# Patient Record
Sex: FEMALE | Race: ASIAN | NOT HISPANIC OR LATINO | Employment: UNEMPLOYED | URBAN - METROPOLITAN AREA
[De-identification: names, ages, dates, MRNs, and addresses within clinical notes are randomized per-mention and may not be internally consistent; named-entity substitution may affect disease eponyms.]

---

## 2017-01-31 ENCOUNTER — RECORDS - HEALTHEAST (OUTPATIENT)
Dept: GENERAL RADIOLOGY | Facility: CLINIC | Age: 32
End: 2017-01-31

## 2017-01-31 ENCOUNTER — OFFICE VISIT - HEALTHEAST (OUTPATIENT)
Dept: FAMILY MEDICINE | Facility: CLINIC | Age: 32
End: 2017-01-31

## 2017-01-31 DIAGNOSIS — M54.50 LOW BACK PAIN: ICD-10-CM

## 2017-01-31 DIAGNOSIS — M54.16 RADICULOPATHY, LUMBAR REGION: ICD-10-CM

## 2017-01-31 DIAGNOSIS — M54.16 LUMBAR RADICULAR PAIN: ICD-10-CM

## 2017-02-14 ENCOUNTER — OFFICE VISIT - HEALTHEAST (OUTPATIENT)
Dept: FAMILY MEDICINE | Facility: CLINIC | Age: 32
End: 2017-02-14

## 2017-02-14 DIAGNOSIS — M54.16 LUMBAR RADICULAR PAIN: ICD-10-CM

## 2017-02-14 ASSESSMENT — MIFFLIN-ST. JEOR: SCORE: 1086.75

## 2017-02-17 ENCOUNTER — RECORDS - HEALTHEAST (OUTPATIENT)
Dept: ADMINISTRATIVE | Facility: OTHER | Age: 32
End: 2017-02-17

## 2017-02-22 ENCOUNTER — OFFICE VISIT - HEALTHEAST (OUTPATIENT)
Dept: FAMILY MEDICINE | Facility: CLINIC | Age: 32
End: 2017-02-22

## 2017-02-22 ENCOUNTER — AMBULATORY - HEALTHEAST (OUTPATIENT)
Dept: NEUROSURGERY | Facility: CLINIC | Age: 32
End: 2017-02-22

## 2017-02-22 DIAGNOSIS — M54.17 LUMBOSACRAL RADICULOPATHY AT S1: ICD-10-CM

## 2017-02-22 DIAGNOSIS — Z23 NEED FOR IMMUNIZATION AGAINST INFLUENZA: ICD-10-CM

## 2017-02-28 ENCOUNTER — AMBULATORY - HEALTHEAST (OUTPATIENT)
Dept: NEUROSURGERY | Facility: CLINIC | Age: 32
End: 2017-02-28

## 2017-02-28 DIAGNOSIS — M54.9 BACK PAIN: ICD-10-CM

## 2017-03-07 ENCOUNTER — AMBULATORY - HEALTHEAST (OUTPATIENT)
Dept: NEUROSURGERY | Facility: CLINIC | Age: 32
End: 2017-03-07

## 2017-03-07 ENCOUNTER — OFFICE VISIT - HEALTHEAST (OUTPATIENT)
Dept: NEUROSURGERY | Facility: CLINIC | Age: 32
End: 2017-03-07

## 2017-03-07 ENCOUNTER — HOSPITAL ENCOUNTER (OUTPATIENT)
Dept: RADIOLOGY | Facility: CLINIC | Age: 32
Discharge: HOME | End: 2017-03-07
Attending: NEUROLOGICAL SURGERY

## 2017-03-07 DIAGNOSIS — M51.16 LUMBAR DISC HERNIATION WITH RADICULOPATHY: ICD-10-CM

## 2017-03-07 DIAGNOSIS — M54.9 BACK PAIN: ICD-10-CM

## 2017-03-07 ASSESSMENT — MIFFLIN-ST. JEOR: SCORE: 1055

## 2017-03-15 ENCOUNTER — HOSPITAL ENCOUNTER (OUTPATIENT)
Dept: PHYSICAL MEDICINE AND REHAB | Facility: CLINIC | Age: 32
Discharge: HOME | End: 2017-03-15
Attending: NEUROLOGICAL SURGERY

## 2017-03-15 DIAGNOSIS — M51.16 LUMBAR DISC HERNIATION WITH RADICULOPATHY: ICD-10-CM

## 2017-03-15 DIAGNOSIS — M54.16 LUMBAR RADICULITIS: ICD-10-CM

## 2017-03-15 DIAGNOSIS — M53.3 SACROILIAC JOINT DYSFUNCTION OF RIGHT SIDE: ICD-10-CM

## 2017-03-15 DIAGNOSIS — G47.9 SLEEP DISTURBANCE: ICD-10-CM

## 2017-03-15 DIAGNOSIS — M51.26 LUMBAR DISC HERNIATION: ICD-10-CM

## 2017-03-15 ASSESSMENT — MIFFLIN-ST. JEOR: SCORE: 1073.14

## 2017-03-29 ENCOUNTER — HOSPITAL ENCOUNTER (OUTPATIENT)
Dept: PHYSICAL MEDICINE AND REHAB | Facility: CLINIC | Age: 32
Discharge: HOME | End: 2017-03-29
Attending: PHYSICIAN ASSISTANT

## 2017-03-29 DIAGNOSIS — M51.16 LUMBAR DISC HERNIATION WITH RADICULOPATHY: ICD-10-CM

## 2017-04-05 ENCOUNTER — PRENATAL OFFICE VISIT - HEALTHEAST (OUTPATIENT)
Dept: FAMILY MEDICINE | Facility: CLINIC | Age: 32
End: 2017-04-05

## 2017-04-05 DIAGNOSIS — Z34.90 NORMAL PREGNANCY: ICD-10-CM

## 2017-04-05 DIAGNOSIS — N92.6 ABNORMAL MENSES: ICD-10-CM

## 2017-04-05 DIAGNOSIS — Z32.01 POSITIVE PREGNANCY TEST: ICD-10-CM

## 2017-04-27 ENCOUNTER — HOSPITAL ENCOUNTER (OUTPATIENT)
Dept: PHYSICAL MEDICINE AND REHAB | Facility: CLINIC | Age: 32
Discharge: HOME | End: 2017-04-27
Attending: PHYSICIAN ASSISTANT

## 2017-04-27 DIAGNOSIS — M51.16 LUMBAR DISC HERNIATION WITH RADICULOPATHY: ICD-10-CM

## 2017-05-09 ENCOUNTER — AMBULATORY - HEALTHEAST (OUTPATIENT)
Dept: PHYSICAL MEDICINE AND REHAB | Facility: CLINIC | Age: 32
End: 2017-05-09

## 2017-05-25 ENCOUNTER — HOSPITAL ENCOUNTER (OUTPATIENT)
Dept: PHYSICAL MEDICINE AND REHAB | Facility: CLINIC | Age: 32
Discharge: HOME | End: 2017-05-25
Attending: PHYSICIAN ASSISTANT

## 2017-05-25 DIAGNOSIS — M54.16 LUMBAR RADICULITIS: ICD-10-CM

## 2017-05-25 DIAGNOSIS — M51.26 LUMBAR DISC HERNIATION: ICD-10-CM

## 2017-05-25 DIAGNOSIS — M51.16 LUMBAR DISC HERNIATION WITH RADICULOPATHY: ICD-10-CM

## 2017-05-25 DIAGNOSIS — M79.18 MYOFASCIAL PAIN: ICD-10-CM

## 2017-07-13 ENCOUNTER — HOSPITAL ENCOUNTER (OUTPATIENT)
Dept: PHYSICAL MEDICINE AND REHAB | Facility: CLINIC | Age: 32
Discharge: HOME | End: 2017-07-13
Attending: PHYSICIAN ASSISTANT

## 2017-07-13 DIAGNOSIS — M54.16 LUMBAR RADICULITIS: ICD-10-CM

## 2017-07-13 DIAGNOSIS — M51.16 LUMBAR DISC HERNIATION WITH RADICULOPATHY: ICD-10-CM

## 2017-08-24 ENCOUNTER — HOSPITAL ENCOUNTER (OUTPATIENT)
Dept: PHYSICAL MEDICINE AND REHAB | Facility: CLINIC | Age: 32
Discharge: HOME | End: 2017-08-24
Attending: PHYSICIAN ASSISTANT

## 2017-08-24 DIAGNOSIS — M51.16 LUMBAR DISC HERNIATION WITH RADICULOPATHY: ICD-10-CM

## 2017-08-24 DIAGNOSIS — M54.16 LUMBAR RADICULITIS: ICD-10-CM

## 2017-08-30 ENCOUNTER — HOSPITAL ENCOUNTER (OUTPATIENT)
Dept: PHYSICAL MEDICINE AND REHAB | Facility: CLINIC | Age: 32
Discharge: HOME | End: 2017-08-30
Attending: PHYSICIAN ASSISTANT

## 2017-08-30 DIAGNOSIS — M51.16 LUMBAR DISC HERNIATION WITH RADICULOPATHY: ICD-10-CM

## 2017-08-30 DIAGNOSIS — M54.16 LUMBAR RADICULITIS: ICD-10-CM

## 2017-09-08 ENCOUNTER — HOSPITAL ENCOUNTER (OUTPATIENT)
Dept: PHYSICAL MEDICINE AND REHAB | Facility: CLINIC | Age: 32
Discharge: HOME | End: 2017-09-08
Attending: PHYSICIAN ASSISTANT

## 2017-09-08 DIAGNOSIS — M54.16 LUMBAR RADICULITIS: ICD-10-CM

## 2017-09-08 DIAGNOSIS — M51.16 LUMBAR DISC HERNIATION WITH RADICULOPATHY: ICD-10-CM

## 2017-09-08 RX ORDER — GABAPENTIN 100 MG/1
100 CAPSULE ORAL AT BEDTIME
Refills: 2 | Status: SHIPPED | COMMUNITY
Start: 2017-08-25 | End: 2021-10-04

## 2018-03-20 ENCOUNTER — OFFICE VISIT - HEALTHEAST (OUTPATIENT)
Dept: FAMILY MEDICINE | Facility: CLINIC | Age: 33
End: 2018-03-20

## 2018-03-20 DIAGNOSIS — M54.41 CHRONIC RIGHT-SIDED LOW BACK PAIN WITH RIGHT-SIDED SCIATICA: ICD-10-CM

## 2018-03-20 DIAGNOSIS — G89.29 CHRONIC RIGHT-SIDED LOW BACK PAIN WITH RIGHT-SIDED SCIATICA: ICD-10-CM

## 2018-03-20 RX ORDER — ASPIRIN 81 MG
TABLET,CHEWABLE ORAL
Qty: 1 TUBE | Refills: 0 | Status: SHIPPED | OUTPATIENT
Start: 2018-03-20 | End: 2021-10-15

## 2018-03-26 ENCOUNTER — HOSPITAL ENCOUNTER (OUTPATIENT)
Dept: PHYSICAL MEDICINE AND REHAB | Facility: CLINIC | Age: 33
Discharge: HOME | End: 2018-03-26
Attending: PHYSICIAN ASSISTANT

## 2018-03-26 DIAGNOSIS — M54.16 LUMBAR RADICULITIS: ICD-10-CM

## 2018-03-26 DIAGNOSIS — M51.16 LUMBAR DISC HERNIATION WITH RADICULOPATHY: ICD-10-CM

## 2018-04-18 ENCOUNTER — HOSPITAL ENCOUNTER (OUTPATIENT)
Dept: PHYSICAL MEDICINE AND REHAB | Facility: CLINIC | Age: 33
Discharge: HOME | End: 2018-04-18
Attending: PHYSICIAN ASSISTANT

## 2018-04-18 DIAGNOSIS — M51.16 LUMBAR DISC HERNIATION WITH RADICULOPATHY: ICD-10-CM

## 2018-04-18 DIAGNOSIS — M54.16 LUMBAR RADICULITIS: ICD-10-CM

## 2018-04-18 DIAGNOSIS — M51.26 LUMBAR DISC HERNIATION: ICD-10-CM

## 2018-04-24 ENCOUNTER — RECORDS - HEALTHEAST (OUTPATIENT)
Dept: LAB | Facility: CLINIC | Age: 33
End: 2018-04-24

## 2018-04-24 LAB
ALBUMIN SERPL-MCNC: 3.7 G/DL (ref 3.5–5)
ALP SERPL-CCNC: 93 U/L (ref 45–120)
ALT SERPL W P-5'-P-CCNC: 19 U/L (ref 0–45)
ANION GAP SERPL CALCULATED.3IONS-SCNC: 17 MMOL/L (ref 5–18)
AST SERPL W P-5'-P-CCNC: 13 U/L (ref 0–40)
BILIRUB SERPL-MCNC: 0.6 MG/DL (ref 0–1)
BUN SERPL-MCNC: 11 MG/DL (ref 8–22)
CALCIUM SERPL-MCNC: 9.2 MG/DL (ref 8.5–10.5)
CHLORIDE BLD-SCNC: 103 MMOL/L (ref 98–107)
CHOLEST SERPL-MCNC: 295 MG/DL
CO2 SERPL-SCNC: 20 MMOL/L (ref 22–31)
CREAT SERPL-MCNC: 0.63 MG/DL (ref 0.6–1.1)
FASTING STATUS PATIENT QL REPORTED: NO
GFR SERPL CREATININE-BSD FRML MDRD: >60 ML/MIN/1.73M2
GLUCOSE BLD-MCNC: 138 MG/DL (ref 70–125)
HDLC SERPL-MCNC: 74 MG/DL
LDLC SERPL CALC-MCNC: 194 MG/DL
POTASSIUM BLD-SCNC: 3.6 MMOL/L (ref 3.5–5)
PROT SERPL-MCNC: 7.6 G/DL (ref 6–8)
SODIUM SERPL-SCNC: 140 MMOL/L (ref 136–145)
TRIGL SERPL-MCNC: 133 MG/DL

## 2018-04-25 LAB
C TRACH DNA SPEC QL PROBE+SIG AMP: NEGATIVE
HPV SOURCE: NORMAL
HUMAN PAPILLOMA VIRUS 16 DNA: NEGATIVE
HUMAN PAPILLOMA VIRUS 18 DNA: NEGATIVE
HUMAN PAPILLOMA VIRUS FINAL DIAGNOSIS: NORMAL
HUMAN PAPILLOMA VIRUS OTHER HR: NEGATIVE
N GONORRHOEA DNA SPEC QL NAA+PROBE: NEGATIVE
SPECIMEN DESCRIPTION: NORMAL

## 2018-05-14 ENCOUNTER — COMMUNICATION - HEALTHEAST (OUTPATIENT)
Dept: PHYSICAL MEDICINE AND REHAB | Facility: CLINIC | Age: 33
End: 2018-05-14

## 2018-05-14 ENCOUNTER — HOSPITAL ENCOUNTER (OUTPATIENT)
Dept: PHYSICAL MEDICINE AND REHAB | Facility: CLINIC | Age: 33
Discharge: HOME | End: 2018-05-14
Attending: PHYSICIAN ASSISTANT

## 2018-05-14 DIAGNOSIS — M54.16 LUMBAR RADICULITIS: ICD-10-CM

## 2018-05-14 DIAGNOSIS — G47.9 SLEEP DISTURBANCE: ICD-10-CM

## 2018-05-14 DIAGNOSIS — M51.16 LUMBAR DISC HERNIATION WITH RADICULOPATHY: ICD-10-CM

## 2018-05-15 RX ORDER — METHOCARBAMOL 500 MG/1
TABLET, FILM COATED ORAL
Qty: 30 TABLET | Refills: 3 | Status: SHIPPED | OUTPATIENT
Start: 2018-05-15 | End: 2021-10-04

## 2018-05-25 ENCOUNTER — RECORDS - HEALTHEAST (OUTPATIENT)
Dept: ADMINISTRATIVE | Facility: OTHER | Age: 33
End: 2018-05-25

## 2021-05-30 VITALS — WEIGHT: 108 LBS | BODY MASS INDEX: 22.38 KG/M2

## 2021-05-30 VITALS — WEIGHT: 105 LBS | HEIGHT: 58 IN | BODY MASS INDEX: 22.04 KG/M2

## 2021-05-30 VITALS — HEIGHT: 58 IN | WEIGHT: 109 LBS | BODY MASS INDEX: 22.88 KG/M2

## 2021-05-30 VITALS — BODY MASS INDEX: 22.38 KG/M2 | WEIGHT: 108 LBS

## 2021-05-30 VITALS — BODY MASS INDEX: 22.59 KG/M2 | WEIGHT: 109 LBS

## 2021-05-30 VITALS — BODY MASS INDEX: 22.64 KG/M2 | WEIGHT: 108 LBS

## 2021-05-30 VITALS — HEIGHT: 58 IN | BODY MASS INDEX: 23.51 KG/M2 | WEIGHT: 112 LBS

## 2021-05-31 VITALS — WEIGHT: 108 LBS | BODY MASS INDEX: 22.38 KG/M2

## 2021-05-31 VITALS — BODY MASS INDEX: 22.59 KG/M2 | WEIGHT: 109 LBS

## 2021-05-31 VITALS — WEIGHT: 109 LBS | BODY MASS INDEX: 22.59 KG/M2

## 2021-06-01 VITALS — BODY MASS INDEX: 23.41 KG/M2 | WEIGHT: 113 LBS

## 2021-06-01 VITALS — BODY MASS INDEX: 23.92 KG/M2 | WEIGHT: 115.44 LBS

## 2021-06-01 VITALS — WEIGHT: 113 LBS | BODY MASS INDEX: 23.41 KG/M2

## 2021-06-02 ENCOUNTER — RECORDS - HEALTHEAST (OUTPATIENT)
Dept: ADMINISTRATIVE | Facility: CLINIC | Age: 36
End: 2021-06-02

## 2021-06-08 NOTE — PROGRESS NOTES
"Subjective: Patient comes in for evaluation is a 31-year-old female patient was seen couple weeks ago for right lumbar radiculopathy    Please see previous history regarding this as well she had an x-ray which was negative on 1/31/17    Patient has pain that radiates down the buttocks the leg laterally to the foot.  She did have a little decreased patellar reflex there was some present on the right but not as brisk as on the left.    She's had problems on and off since 2007 she's had previous therapy previous treatments with the TENS unit etc.    The Medrol Dosepak didn't really help that much she has ongoing symptoms    Tobacco status: She  reports that she has never smoked. She does not have any smokeless tobacco history on file.    Patient Active Problem List    Diagnosis Date Noted     Low back pain 03/25/2015     Motion Sickness      Anemia      Thrombocytopenia        Current Outpatient Prescriptions   Medication Sig Dispense Refill     ibuprofen (ADVIL,MOTRIN) 200 MG tablet Take 3 tablets (600 mg total) by mouth every 6 (six) hours as needed for pain. 30 tablet 0     TENS unit electrodes 2X2 \" Pads Use as directed by physical therapist 30 each 5     TENS units Jia Use as directed by physical therapist 1 Device 0     methylPREDNISolone (MEDROL DOSEPACK) 4 mg tablet follow package directions 21 tablet 0     No current facility-administered medications for this visit.        ROS:   Review of systems negative other than as outlined above no incontinence    Objective:    Visit Vitals     /70 (Patient Site: Right Arm, Patient Position: Sitting, Cuff Size: Adult Regular)     Pulse 80     Temp 97.3  F (36.3  C) (Oral)     Resp 20     Ht 4' 10.25\" (1.48 m)     Wt 112 lb (50.8 kg)     LMP 01/12/2017     BMI 23.21 kg/m2     Body mass index is 23.21 kg/(m^2).      Vital signs are stable she's afebrile    Skin was normal    She has the pain in through the right buttocks as described above she does get pain " radiating laterally towards the foot she has decreased patellar reflex on the right slightly strength was normal no significant pain with straight leg raising      No muscle wasting    No edema.        Assessment:  1. Lumbar radicular pain  MR Lumbar Spine Without Contrast     We'll check MRI and contact patient    Plan:  As above    This transcription uses voice recognition software, which may contain typographical errors.

## 2021-06-08 NOTE — PROGRESS NOTES
"Subjective: This 31-year-old female comes in for evaluation she's had some pain in her legs for a while she's been seen on and off over the last couple years had gone to physical therapy had used some TENS unit    She is pain in the lower back it's into the right buttocks and goes down her leg posteriorly and somewhat to the lateral side.  She complains of some tingling that's almost always there in the leg.    She also had some tingling in the shoulder blade area at times although not as significant.    She denies any weakness in the leg.    Last period 1/12/17.    She's not had any imaging done    Tobacco status: She  reports that she has never smoked. She does not have any smokeless tobacco history on file.    Patient Active Problem List    Diagnosis Date Noted     Low back pain 03/25/2015     Motion Sickness      Anemia      Thrombocytopenia        Current Outpatient Prescriptions   Medication Sig Dispense Refill     ibuprofen (ADVIL,MOTRIN) 200 MG tablet Take 3 tablets (600 mg total) by mouth every 6 (six) hours as needed for pain. 30 tablet 0     TENS unit electrodes 2X2 \" Pads Use as directed by physical therapist 30 each 5     TENS units Jia Use as directed by physical therapist 1 Device 0     methylPREDNISolone (MEDROL DOSEPACK) 4 mg tablet follow package directions 21 tablet 0     No current facility-administered medications for this visit.        ROS:   Review of systems negative other than his elbow and above denies any urinary incontinence or bowel incontinence.    Objective:    Visit Vitals     BP 90/60 (Patient Site: Left Arm, Patient Position: Sitting, Cuff Size: Adult Regular)     Pulse 68     Resp 16     Wt 108 lb (49 kg)     LMP 01/12/2017     BMI 22.64 kg/m2     Body mass index is 22.64 kg/(m^2).      Gen. appearance healthy no acute distress    Lungs were clear heart was regular    No abdominal pain    No significant spinal tenderness in the lower spine she does have some right sciatic and " paralumbar discomfort.  X-ray looked normal lumbar spine    Negative straight leg raising normal reflexes skin was normal pulses were full            Assessment:  1. Lumbar radicular pain  XR Lumbar Spine 2 or 3 VWS    methylPREDNISolone (MEDROL DOSEPACK) 4 mg tablet   2. Low back pain       Lumbar radicular pain treated with Medrol pack, call patient if radiologic reading warrants    Follow-up for recheck in a couple weeks if persisting symptoms mainly going for MRI, EMG or possible neurologic consult    Plan:  As above    This transcription uses voice recognition software, which may contain typographical errors.

## 2021-06-09 NOTE — PROGRESS NOTES
"Assessment/Plan:        Diagnoses and all orders for this visit:    Lumbar disc herniation with radiculopathy  -     OPS TFESI Lumbar Sacral Unilateral; Future; Expected date: 3/7/17  -     Ambulatory referral to Spine Care          It was a pleasure to evaluate Ellie Cooper at the kind referral of Dr. Matt Booker for low back pain radiating to right posterior calf.    Ellie has right S1 sensory radiculopathy with no motor weakness. She has never tried an epidural steroid injection. She really does not want to consider surgery at this point. I have ordered a right S1-S2 TFESI for her to see if this gives her adequate pain relief on her radiculopathy.   Because patient does not want to consider surgery for lumbar radiculopathy at this time,  I will refer her to the [facility] to establish nonoperative spine care, and she will follow up at the [facility] for this and for ongoing management. If she fails to improve with the TFESI and wants to consider surgery, she is welcome to return to see me at any time for a discussion of surgery which would be a minimally-invasive microdiskectomy.    If she returns to see me, I would like to schedule her appointment with a certified  to be sure that risks/benefits of surgical intervention and medical terminology is adequately translated.      I spent 55 minutes in patient care with greater than 50% spent in counseling and/or coordination of care.    I performed independent visualization of radiographic imaging and entered my own interpretation, reviewed and/or ordered tests in radiology, made the decision to obtain old records and/or history from someone other than the patient and Reviewed and summarized old records and/or discussed this case with another health care provider\".      Subjective:    Patient ID: Ellie Cooper is a 31 y.o. female.    HPI      Ellie is non-English speaking, she is accompanied by a friend who translates for her, she states " she does not need a , I offered to have one for future visits.    Ellie has low back pain radiating to right posterior calf, present since [id number]; exacerbated with movement, relieved minorly with TENS unit, no help with PT.  No prior surgery  Only minor intermittent left leg symptoms  NO bladder or bowel incontinence or urgency    I reviewed Dr. Booker's note from 1/21/17 indicating that patient was given a medrol dose-anitra for her symptoms and sent for an MRI lumbar spine; she had done PT and used TENS unit from treatment in the past. After MRI findings, patient was referred to see me. She has never had a prior TAE.      IMAGING per my interpretation: MRI lumbar spine 2/17 at [facility] with broad-based L5-S1 disc bulge eccentric to right with narrowing of right lateral recess affecting traversing right S1 nerve root; incidental finding of left L1 perineural cyst  Lumbar flex-ext xrays 3/17 with normal alignment, no instability    Medications: ibuprofen prn pain    Review of Systems   Constitutional: Negative for activity change, appetite change, chills, fatigue, fever and unexpected weight change.   HENT: Negative for dental problem, mouth sores and trouble swallowing.    Eyes: Negative for visual disturbance.   Respiratory: Negative for shortness of breath.    Cardiovascular: Negative for leg swelling.   Gastrointestinal: Negative for abdominal pain, constipation, diarrhea, nausea and vomiting.   Endocrine: Negative for cold intolerance.   Genitourinary: Negative for difficulty urinating, dysuria, enuresis, frequency and urgency.   Musculoskeletal: Positive for back pain. Negative for gait problem, neck pain and neck stiffness.   Skin: Negative for color change.   Allergic/Immunologic: Negative for immunocompromised state.   Neurological: Positive for numbness. Negative for tremors, speech difficulty, weakness and headaches.   Hematological: Does not bruise/bleed easily.   Psychiatric/Behavioral:  The patient is not nervous/anxious.      Past Medical History:   Diagnosis Date     Anemia     Created by Conversion      Depression      Thrombocytopenia     Created by [facility] Annotation: Aug  8 2011 11:17AM - Edmund Turner: gestational, no  known prior hx of thrombocytopenia      Past Surgical History:   Procedure Laterality Date     NO PAST SURGERIES       Social History     Social History     Marital status:      Spouse name: N/A     Number of children: N/A     Years of education: N/A     Occupational History     Not on file.     Social History Main Topics     Smoking status: Never Smoker     Smokeless tobacco: Never Used     Alcohol use No     Drug use: No     Sexual activity: Not on file     Other Topics Concern     Not on file     Social History Narrative     Family History   Problem Relation Age of Onset     No Medical Problems Mother      No Medical Problems Father      Diabetes Sister              Objective:    Physical Exam   Constitutional: She is oriented to person, place, and time. She appears well-developed and well-nourished. She is cooperative. No distress.   HENT:   Head: Normocephalic and atraumatic.   Eyes: Conjunctivae are normal.   Neck: Normal range of motion. Neck supple. No spinous process tenderness and no muscular tenderness present. No tracheal deviation present.   Cardiovascular: Normal rate and regular rhythm.    Pulmonary/Chest: Effort normal and breath sounds normal.   Abdominal: Soft. Bowel sounds are normal. She exhibits no distension. There is no tenderness.   Musculoskeletal:   Cervical flexion/extension ROM: normal  Lumbar flexion/extension ROM: normal   Neurological: She is alert and oriented to person, place, and time. A sensory deficit is present. No cranial nerve deficit. She displays a negative Romberg sign. Gait normal. She displays no Babinski's sign on the right side. She displays no Babinski's sign on the left side.   Reflex Scores:       Bicep  reflexes are 2+ on the right side and 2+ on the left side.       Brachioradialis reflexes are 2+ on the right side and 2+ on the left side.       Patellar reflexes are 2+ on the right side and 2+ on the left side.       Achilles reflexes are 1+ on the right side and 2+ on the left side.  Strength:                                                LEFT      RIGHT    Hip Flexion                               5            5   Knee Extension                       5             5  Dorsiflexion                              5             5  Extensor Hallucis Longus        5            5  Plantar Flexion                         5             5    Able to do single-leg standing calf raises on both legs equally, has pain with right standing leg    No Lhermitte's, No Spurling's  No Angeline's   No ankle clonus  Able to tandem walk  Decreased sensation right lateral and plantar foot       Skin: Skin is warm, dry and intact.   Psychiatric: She has a normal mood and affect. Her speech is normal and behavior is normal.

## 2021-06-09 NOTE — PROGRESS NOTES
Neurosurgery consultation was requested by:  {Matt Booker MD  Pain location: back    Radicular Pain is present: bilateral leg pain to to toes, right is worse than the left   Lhermitte sign: denies     Motor complaints: right leg is weaker than the left, shoulder tightness  Sensory complaints: numbness and tingling in right leg down to toes, also occasional hand numbness when shes gripping stuff     Gait and balance issues: denies   Bowel or bladder issues: denies      Duration of SX is: since 2007 or 2008  The symptoms are worse with: sitting straight  The symptoms are better with: rest, ibuprofen and tens unit  Injury: denies     Severity is: mild/ moderate     Patient has tried the following conservative measures: tens unit- sometimes helpful, PT- not helpful   HERNANDEZ score is:    62   %     Jaspreet, CMA

## 2021-06-09 NOTE — PROGRESS NOTES
"  Subjective:    Ellie Cooper is a 31 y.o. female who presents for possible pregnancy.  Patient's last menstrual period was 02/12/2017 (approximate).  She gets pretty regular monthly periods.  She has not used any birth control in the past year.  This is an unplanned pregnancy.  She has been seeing spine care for back pain.  She had an MRI of her spine on 2/17/2017.  She had a spine x-ray in 2/28/2017.  She had a steroid injection in her back on 3/15/2017.  She was likely less than 2 weeks pregnant at the time of her x-ray and steroid injections.  She has some nausea and vomiting.  Some burning in her stomach.  She is a past history of anemia.  She was prescribed gabapentin from her back doctor, but she has not started that yet.  I reviewed she should not take that while she is pregnant.  She also has a TENS unit, she does not use it regularly.  She has not been taking ibuprofen.  After doing a complete pregnancy intake, including past medical history, patient reports that she this was an unplanned pregnancy and she would like to terminate pregnancy.  Chart will be updated with current, past, medical, family, surgical histories.    Patient Active Problem List   Diagnosis     Motion Sickness     Anemia     Thrombocytopenia     Low back pain     Lumbar disc herniation with radiculopathy     Normal pregnancy       Current Outpatient Prescriptions:      TENS unit electrodes 2X2 \" Pads, Use as directed by physical therapist, Disp: 30 each, Rfl: 5     TENS units Jia, Use as directed by physical therapist, Disp: 1 Device, Rfl: 0    Objective:   Allergies:  Review of patient's allergies indicates no known allergies.    Vitals:    04/05/17 0912   BP: (!) 84/56   Pulse: 78   Resp: 18   SpO2: 99%     Body mass index is 22.38 kg/(m^2).    General: Alert and oriented x 3, in no apparent distress    Lab Results   Component Value Date    PREGTESTUR Positive (!) 04/05/2017     Assessment and Plan:   1.  Positive pregnancy " test.  We completed normal pregnancy intake visit today.  At the end of the visit, patient stated this was an unplanned pregnancy and she wants to terminate the pregnancy.  Contact information was given to her on [facility].  She will contact them for follow-up.  If she has any difficulty contacting them, scheduling appointment, or other concerns, she is encouraged to call the clinic.    Visit was approximately 35 minutes, greater than 50% of time spent in face-to-face counseling and coordination of care.    This dictation uses voice recognition software, which may contain typographical errors.

## 2021-06-09 NOTE — PROGRESS NOTES
"Subjective: This patient comes in for follow-up evaluation.  This patient has pain in through the right lower back through the posterior thigh and leg and has all numbness in the little toe.    Patient's had ongoing issues hasn't responded to medications, she went on for an MRI which showed a L5-S1 disc bulge impacting the right S1 nerve root.    She denies any significant weakness.    Tobacco status: She  reports that she has never smoked. She does not have any smokeless tobacco history on file.    Patient Active Problem List    Diagnosis Date Noted     Low back pain 03/25/2015     Motion Sickness      Anemia      Thrombocytopenia        Current Outpatient Prescriptions   Medication Sig Dispense Refill     ibuprofen (ADVIL,MOTRIN) 200 MG tablet Take 3 tablets (600 mg total) by mouth every 6 (six) hours as needed for pain. 30 tablet 0     methylPREDNISolone (MEDROL DOSEPACK) 4 mg tablet follow package directions 21 tablet 0     TENS unit electrodes 2X2 \" Pads Use as directed by physical therapist 30 each 5     TENS units Jia Use as directed by physical therapist 1 Device 0     No current facility-administered medications for this visit.        ROS:  Review of systems positives outlined otherwise negative no incontinence    Objective:    Visit Vitals     /70 (Patient Site: Right Arm, Patient Position: Sitting, Cuff Size: Adult Regular)     Pulse 68     Resp 16     Wt 109 lb (49.4 kg)     LMP 02/12/2017     BMI 22.59 kg/m2     Body mass index is 22.59 kg/(m^2).      General appearance no acute distress    She has pain in the right lower back and into the buttock she describes pain radiating down the leg laterally.  Slight decreased acuities reflex on the right.    Numbness right little toe    Skin was normal    No significant weakness        Assessment:  1. Lumbosacral radiculopathy at S1  Ambulatory referral to Neurosurgery   2. Need for immunization against influenza  Influenza, Seasonal Quad, Preservative " Free 36+ Months     Referral to neurosurgery    Flu shot was given    Plan:  Total time with patient 15 minutes over 10 minutes counseling reviewing her symptoms reviewing the MRI and the consistent findings between the 2, coordinating care    This transcription uses voice recognition software, which may contain typographical errors.

## 2021-06-09 NOTE — PROGRESS NOTES
Assessment:   Ellie Cooper is a 31 y.o. y.o. female with past medical history significant for depression who presents today for follow-up regarding low back pain with radiation to the right lower extremity with associated numbness and tingling.  MRI of the lumbar spine shows a broad-based L5-S1 disc bulge eccentric to the right resulting in lateral recess stenosis and affecting the right S1 nerve root.  The patient is status post a right S1-S2 transforaminal epidural steroid injection on March 15, 2017 which provided 50% relief of her pain.       Plan:     A shared decision making plan was used.  The patient's values and choices were respected.  The following represents what was discussed and decided upon by the physician assistant and the patient.  An  was present for the visit.    1.  DIAGNOSTIC TESTS: I reviewed the MRI of lumbar spine.  No further diagnostic tests were ordered.    2.  PHYSICAL THERAPY: No further physical therapy was ordered.  The patient participated in physical therapy in 2013 and 2014.  She declined an offer to return to physical therapy.    3.  MEDICATIONS: Gabapentin 100 mg was prescribed.  She was given a dosage titration chart.  She may increase her dose to maximum of 300 mg 3 times daily.  -The patient can continue using ibuprofen as needed.    4.  INTERVENTIONS: I offered the patient a right L5-S1 transforaminal epidural steroid injection.  I explained how this would be different than the right S1-S2 transforaminal epidural steroid injection.  Hopefully, I could provide additional pain relief.  The patient indicated she would like to think about it.  She will call our clinic and let us know if she wishes to proceed with a right L5-S1 transforaminal epidural steroid injection.    5.  PATIENT EDUCATION:   -I offered for the patient to return to Dr. Graham since she continues have pain after her epidural steroid injection.  The patient declined.  She is not interested in  pursuing surgical intervention at this point.  She would like to continue to treat her pain conservatively.  -The patient is in agreement with the above plan.  All questions were answered.    6.  FOLLOW-UP: The patient will call our clinic if she would like to proceed with a right L5-S1 transforaminal epidural steroid injection.  If she does not move forward with the injection, I would like to see the patient back in the clinic in 4 weeks to see how she is doing with the gabapentin.  She has any questions or concerns in the meantime, she should not hesitate to contact our clinic.    Subjective:     Ellie Cooper is a 31 y.o. female who presents today for follow-up regarding low back pain with radiation into the right lower extremity with associated numbness and tingling.  She reports that this has provided 50% relief of her pain.    The patient continues to complain of right-sided low back pain.  The pain radiates into the right buttock, down the posterior thigh, and into the posterior calf, ending at the heel.  The pain is less severe than it was before her injection.  She rates her pain today as a 4-5 out of 10.  At its best is a 4-10.  At its worst it is a 5 out of 10.  Her pain is aggravated with prolonged sitting and standing.  It is alleviated temporarily with repositioning.  The patient continues to have intermittent numbness and tingling in the same distribution as her pain, although it is not as severe as it was prior to her injection.  She also continues to feel that the right leg is generally weaker than the left.    The patient went to physical therapy in 2013 and 2014.  She did not feel it was helpful.  The patient is currently using ibuprofen as needed for pain.    Review of Systems:  Positive for numbness/tingling, weakness, headache, dizziness, blurry vision, balance changes.  Negative for loss of bowel/bladder control, footdrop, nausea/vomiting.     Objective:   CONSTITUTIONAL:  Vital signs as  above.  No acute distress.  The patient is well nourished and well groomed.    PSYCHIATRIC:  The patient is awake, alert, oriented to person, place and time.  The patient is answering questions appropriately with clear speech.  Normal affect.  HEENT: Normocephalic, atraumatic.  Sclera clear.    SKIN:  Skin over the face, posterior torso, bilateral upper and lower extremities is clean, dry, intact without rashes.  MUSCULOSKELETAL:  Gait is non-antalgic.      The patient has 5/5 strength for the bilateral hip flexors, knee flexors/extensors, ankle dorsiflexors/plantar flexors, ankle evertors/invertors.    NEUROLOGICAL: Subjective diminished sensation in the right S1 dermatome.     RESULTS:  MRI lumbar spine from [facility] dated February 17, 2017 was reviewed. This shows a diffuse disc bulge at L5-S1 eccentric to the right which results in right lateral recess stenosis and impingement upon the right S1 nerve root. There is also mild bilateral foraminal stenosis at this level.

## 2021-06-09 NOTE — PROGRESS NOTES
A consult was placed to Dr. Graham.  The reason for the consult was back pain.  The following XR were ordered to assess for alignment: AP/Lat and Flex/Ext .  Lyly Dias RN, CNRN

## 2021-06-09 NOTE — PROGRESS NOTES
ASSESSMENT: Ellie Cooper is a 31 y.o. female with past medical history significant for depression who presents today for new patient evaluation of low back pain with radiation into the right lower extremity with associated numbness and tingling.  MRI of the lumbar spine shows a broad-based L5-S1 disc bulge eccentric to the right resulting in lateral recess stenosis and affecting the right S1 nerve root.  The patient may also have right sacroiliac joint dysfunction contributing to her pain.    HERNANDEZ:54  WHO 5: 6    PLAN:  A shared decision making model was used.  The patient's values and choices were respected.  The following represents what was discussed and decided upon by the physician assistant and the patient.      1.  DIAGNOSTIC TESTS: I reviewed the MRI lumbar spine from [facility].  No further diagnostic tests were ordered.    2.  PHYSICAL THERAPY: I offered for the patient to return to physical therapy.  She declined.    3.  MEDICATIONS: No changes made to the medications at this point.  If the patient fails to improve after a right S1-S2 trans-foraminal epidural steroid injection, I would recommend starting gabapentin.  She can continue using ibuprofen as needed.    4.  INTERVENTIONS: The patient is scheduled for right S1-S2 transforaminal epidural steroid injection this afternoon with Dr. Mcintosh.  I agree with this recommendation.  -If this injection does not provide relief, I would recommend a right L5-S1 transforaminal epidural steroid injection.  -The patient may also benefit from a right sacroiliac joint dysfunction of her right low back/upper buttock pain fails to improve.    5.  PATIENT EDUCATION: The patient has what other treatment options were available.  I told her she could try chiropractic treatment.  She did not wish to pursue that at this time.  She will consider it if she fails to improve after the injection.    6.  REFERRALS: I did place a referral for behavioral health at the  spine center.  The patient admits that her mood has declined due to her pain.    7.  FOLLOW-UP:   I will see the patient back in the clinic in 2 weeks procedure follow-up.  If she has any questions or concerns in the meantime, she should not hesitate to contact our clinic.      SUBJECTIVE:  Ellie Cooper  Is a 31 y.o. female who presents today in consultation at the request of Dr. Graham for new patient evaluation of low back pain with radiation into the right lower extremity with associated numbness and tingling.  The patient states that she has had this pain for the past 10 years.  She denies any injury or event to cause the pain.  It is getting progressively worse.  She was seen by Dr. Graham.  The patient indicated she did not wish to pursue surgical intervention.  Dr. Graham recommended a right S1-S2 trans-foraminal epidural steroid injection.    The patient claims of right-sided low back pain.  The pain radiates in the right buttock, down the posterior thigh, into the posterior calf, ending at the heel.  She has similar but much less severe pain on the left side.  Her pain is aggravated with prolonged sitting and standing.  It is alleviated temporarily with repositioning and leaning back.  The patient has numbness and tingling in the same distribution as her pain.  She also feels that the right leg is weaker than the left.  The patient states that the pain interferes with her sleep.    The patient to physical therapy in 2013 and 2014.  She did not feel it was helpful.  She tried chiropractic treatment which was not helpful.  She uses a TENS unit which she does not feel is beneficial.  She has never had any spine surgery or spine injections.  She uses ibuprofen 200 mg twice daily.    Current Outpatient Prescriptions on File Prior to Encounter   Medication Sig Dispense Refill     ibuprofen (ADVIL,MOTRIN) 200 MG tablet Take 3 tablets (600 mg total) by mouth every 6 (six) hours as needed for pain. 30 tablet 0      "TENS unit electrodes 2X2 \" Pads Use as directed by physical therapist 30 each 5     TENS units Jia Use as directed by physical therapist 1 Device 0       No Known Allergies    Past Medical History:   Diagnosis Date     Anemia     Created by Conversion      Depression      Thrombocytopenia     Created by [facility] Annotation: Aug  8 2011 11:17AM - Edmund Turner: gestational, no  known prior hx of thrombocytopenia       Patient Active Problem List   Diagnosis     Motion Sickness     Anemia     Thrombocytopenia     Low back pain     Lumbar disc herniation with radiculopathy       Past Surgical History:   Procedure Laterality Date     NO PAST SURGERIES         Family History   Problem Relation Age of Onset     No Medical Problems Mother      No Medical Problems Father      Diabetes Sister      Social history: The patient is .  She works in [email or web address].  She denies tobacco, alcohol, or illicit drug use.    ROS: Positive for back pain, leg pain, depression/worry.  Specifically negative for bowel/bladder dysfunction, fevers,chills, appetite changes, unexplained weight loss.   Otherwise 13 systems reviewed are negative.  Please see the patient's intake questionnaire from today for details.      OBJECTIVE:  PHYSICAL EXAMINATION:    CONSTITUTIONAL:  Vital signs as above.  No acute distress.  The patient is well nourished and well groomed.  PSYCHIATRIC:  The patient is awake, alert, oriented to person, place, time and answering questions appropriately with clear speech.    HEENT: Normocephalic, atraumatic.  Sclera clear.  Neck is supple.  SKIN:  Skin over the face, bilateral lower extremities, and posterior torso is clean, dry, intact without rashes.    GAIT:  Gait is non-antalgic.  The patient is able to heel and toe walk without significant difficulty.    STANDING EXAMINATION: Tenderness to palpation over the right greater than left lower lumbar paraspinous muscles and right sacroiliac joint.  Lumbar flexion " and extension are within normal limits.  MUSCLE STRENGTH:  The patient has 5/5 strength for the bilateral hip flexors, knee flexors/extensors, ankle dorsiflexors/plantar flexors, great toe extensors, ankle evertors/invertors.  NEUROLOGICAL: 2+ patellar, and achilles reflexes bilaterally.  Negative Babinski's bilaterally.  No ankle clonus bilaterally.  Subjective diminished sensation in the right L4, L5, and S1 dermatome.  VASCULAR:  2/4 dorsalis pedis pulses bilaterally.  Bilateral lower extremities are warm.  There is no pitting edema of the bilateral lower extremities.  ABDOMINAL:  Soft, non-distended, non-tender throughout all quadrants.  No pulsatile mass palpated in the left lower quadrant.  LYMPH NODES:  No palpable or tender inguinal lymph nodes.  MUSCULOSKELETAL: Straight leg raise is positive on the right, negative on the left.  Woody's test on the left reproduces right-sided low back/upper buttock pain.    RESULTS: MRI lumbar spine from [facility] dated February 17, 2017 was reviewed.  This shows a diffuse disc bulge at L5-S1 eccentric to the right which results in right lateral recess stenosis and impingement upon the right S1 nerve root.  There is also mild bilateral foraminal stenosis at this level.

## 2021-06-10 NOTE — PROGRESS NOTES
Assessment:   Ellie Cooper is a 31 y.o. y.o. female with past medical history significant for depression who presents today for follow-up regarding low back pain with radiation into the right lower extremity with associated numbness and tingling.  MRI of the lumbar spine shows a broad-based L5-S1 disc bulge eccentric to the right resulting in lateral recess stenosis and affecting the right S1 nerve root.  The patient status post a right S1-S2 transforaminal epidural steroid injection on March 15, 2017 which only provided 50% relief of her pain for about 2 weeks.  Since then, her pain has returned.       Plan:     A shared decision making plan was used.  The patient's values and choices were respected.  The following represents what was discussed and decided upon by the physician assistant and the patient.      1.  DIAGNOSTIC TESTS: I reviewed the MRI of the lumbar spine.  No further diagnostic tests were ordered.    2.  PHYSICAL THERAPY: I placed an order for the patient go to physical therapy at [facility].    3.  MEDICATIONS:    -Diclofenac 50 mg 3 times daily as needed was prescribed.  -The patient should continue titrating her dose of gabapentin.  She is currently taking 100 mg at bedtime.  -I did confirm with the patient that she is no longer pregnant.  She terminated her pregnancy.    4.  INTERVENTIONS: I offered the patient a right L5-S1 transforaminal epidural steroid injection.  I explained how this will be different from her previous injection at S1/2.  The patient declined.    5.  PATIENT EDUCATION: I again offered the patient a referral to a spine surgeon.  She declined.  She would like to avoid surgery.    6.  FOLLOW-UP: I will see the patient back in the clinic in 4 weeks to follow-up.  If she has any questions or concerns in the meantime, she should not hesitate to contact our clinic.    Subjective:     Ellie Cooper is a 31 y.o. female who presents today for follow-up regarding low back pain with  radiation into the right lower extremity.  I last saw the patient on March 29, 2017.  At that time I prescribed gabapentin.  The patient is currently taking 100 mill grams at bedtime.  She does not feel that it is helping. She states that the right S1-S2 transforaminal epidural steroid injection which was performed on March 15, 2017 is no longer providing any relief.  She states the 50% relief only lasted about 2 weeks.    The patient complains of right-sided low back pain.  The pain radiates into the right buttock, down the right posterior lateral thigh, into the posterior lateral calf.  She rates her pain today as an 8 out of 10.  At its best it is a 4-10.  At its worst it is a 10 out of 10.  The patient's pain is aggravated with prolonged sitting and standing.  She denies any alleviating factors.  She has mild numbness in the same distribution as her pain.  She does state that that has improved since she was last seen.  She continues to feel that the right leg is mildly weaker than the left.    The patient did physical therapy in 2013 and 2014.  She did not feel was helpful.  She is using gabapentin 100 mg at bedtime.  She is no longer using ibuprofen.  She did not feel that it was helpful.    Past medical history is reviewed and is pertinent for termination of an unplanned pregnancy.    Family history is reviewed and is unchanged in the interim.    Review of Systems:  Positive for numbness/tingling, weakness, headache, dizziness, blurry vision, balance changes.  Negative for loss of bowel/bladder control, footdrop, nausea/vomiting.     Objective:   CONSTITUTIONAL:  Vital signs as above.  No acute distress.  The patient is well nourished and well groomed.    PSYCHIATRIC:  The patient is awake, alert, oriented to person, place and time.  The patient is answering questions appropriately with clear speech.  Normal affect.  HEENT: Normocephalic, atraumatic.  Sclera clear.    SKIN:  Skin over the face, posterior  torso, bilateral upper and lower extremities is clean, dry, intact without rashes.  MUSCULOSKELETAL:  Gait is non-antalgic.  The patient has 5/5 strength for the bilateral hip flexors, knee flexors/extensors, ankle dorsiflexors/plantar flexors, ankle evertors/invertors.    NEUROLOGICAL: Subjective diminished sensation in the right S1 dermatome.     RESULTS:  MRI lumbar spine from [facility] dated February 17, 2017 was reviewed. This shows a diffuse disc bulge at L5-S1 eccentric to the right which results in right lateral recess stenosis and impingement upon the right S1 nerve root. There is also mild bilateral foraminal stenosis at this level.

## 2021-06-10 NOTE — PROGRESS NOTES
Assessment:   Ellie Cooper is a 31 y.o. y.o. female with past medical history significant for depression who presents today for follow-up regarding low back pain with radiation into the right lower extremity with associated numbness and tingling.  MRI of lumbar spine shows a broad-based L5-S1 disc bulge eccentric to the right resulting in lateral recess stenosis and affecting the right S1 nerve root.  The patient status post a right S1-S2 transforaminal epidural steroid injection on March 15, 2017 which only provided 50% relief of her pain for 2 weeks.  The patient has been participating in physical therapy since then and has had mild improvement in her pain.  The patient also has some bilateral upper back pain which is likely myofascial in nature.       Plan:     A shared decision making plan was used.  The patient's values and choices were respected.  The following represents what was discussed and decided upon by the physician assistant and the patient.      1.  DIAGNOSTIC TESTS: I reviewed the MRI of the lumbar spine.  No further diagnostic tests were ordered.    2.  PHYSICAL THERAPY: The patient is currently in physical therapy at [facility].  The patient requested that I indicate that she could benefit from massage as a part of her physical therapy order.  I did place a new order for that.  I told the patient do not know that this would be covered by her insurance.    3.  MEDICATIONS: No changes are made to the patient's medications.  She is using diclofenac 50 mg daily.  She also uses gabapentin 100 mg at bedtime.  She did not tolerate increasing this dose due to drowsiness.    4.  INTERVENTIONS: I offered the patient a right L5-S1 transforaminal epidural steroid injection.  The patient indicated that she would like to see how she does with physical therapy before trying an injection again.    5.  PATIENT EDUCATION: I also offered to refer the patient to a spine surgeon.  The patient indicated that she would  like to avoid surgery if at all possible.  She would rather try another injection before being referred to a spine surgeon.    6.  FOLLOW-UP: I will see the patient back in clinic in about 6 weeks to monitor her progress.  If she has any questions or concerns in the meantime, she should not hesitate to contact our clinic.    Subjective:     Ellie Cooper is a 31 y.o. female who presents today for follow-up regarding low back pain with radiation into the right lower extremity.  I last saw the patient on April 27, 2017.  At that time I referred the patient to physical therapy at [facility].  The patient has had 2 sessions since she was last seen.  The patient states that this is providing some mild relief of her pain, although it is short-term.    The patient continues to complain of low back pain.  The pain radiates into the right buttock, down the right posterolateral thigh, and into the posterolateral calf.  There is numbness and tingling in the same distribution as her pain.  The patient also complains of mild bilateral upper back pain.  She denies any significant neck pain.  She denies any pain radiating down the arms.  The patient rates her pain today as an 8 out of 10.  At its best it is a 4 out of 10.  At its worst it is a 10 out of 10.  The patient's pain tends to be aggravated with increased activity and alleviated with rest.  She denies any new symptoms since she was last seen.  She does continue to feel that the right leg is weaker than the left.    As mentioned above, the patient is in [facility].  She is doing her home exercises.  Patient is using diclofenac 50 mg once daily.  She is also using gabapentin 100 mg at bedtime.  She did not tolerate titrating this dose due to drowsiness.    Past medical history is reviewed and is unchanged in the interim.    Family history is reviewed and is unchanged in the interim.    Review of Systems:  Positive for numbness/tingling, weakness, headache,  blurry vision, balance changes.  Negative for loss of bowel/bladder control, footdrop, dizziness, nausea/vomiting.     Objective:   CONSTITUTIONAL:  Vital signs as above.  No acute distress.  The patient is well nourished and well groomed.    PSYCHIATRIC:  The patient is awake, alert, oriented to person, place and time.  The patient is answering questions appropriately with clear speech.  Normal affect.  HEENT: Normocephalic, atraumatic.  Sclera clear.    SKIN:  Skin over the face, posterior torso, bilateral upper and lower extremities is clean, dry, intact without rashes.  MUSCULOSKELETAL:  Gait is non-antalgic.   The patient has 5/5 strength for the bilateral hip flexors, knee flexors/extensors, ankle dorsiflexors/plantar flexors, ankle evertors/invertors.    NEUROLOGICAL: Subjective diminished sensation in the right L5/S1 dermatome.     RESULTS:  MRI lumbar spine from [facility] dated February 17, 2017 was reviewed. This shows a diffuse disc bulge at L5-S1 eccentric to the right which results in right lateral recess stenosis and impingement upon the right S1 nerve root. There is also mild bilateral foraminal stenosis at this level.

## 2021-06-11 NOTE — PROGRESS NOTES
Assessment:   Ellie Cooper is a 31 y.o. y.o. female with past medical history significant for depression who presents today for follow-up regarding low back pain with radiation into the right lower extremity with associated numbness and tingling.  MRI of the lumbar spine shows a broad-based L5-S1 disc bulge eccentric to the right resulting in lateral recess stenosis and affecting the right S1 nerve root.  The patient status post a right S1-S2 trans-foraminal epidural steroid injection on March 15, 2017 which only provided 50% relief of her pain for 2 weeks.  She has been participating in physical therapy since then but continues to have significant pain.       Plan:     A shared decision making plan was used.  The patient's values and choices were respected.  The following represents what was discussed and decided upon by the physician assistant and the patient.  An  is present for today's visit.    1.  DIAGNOSTIC TESTS: I reviewed the MRI lumbar spine.  No further diagnostic tests were ordered.    2.  PHYSICAL THERAPY: The patient had been going to physical therapy at [facility].  The patient requested a new referral so that she can attend physical [facility].    3.  MEDICATIONS: No changes are made to the patient's medications.  She uses gabapentin 100 mg at bedtime.  On weekends she takes an additional dose during the day.  She did not tolerate increasing this dose higher due to drowsiness.  She is also using diclofenac 50 mg once or twice daily.    4.  INTERVENTIONS: I offered the patient a right L5-S1 transforaminal epidural steroid injection to see if this may be effective than the right S1-S2 transforaminal epidural steroid injection.  The patient declined.  She would like to see how she does with different physical therapy.    5.  PATIENT EDUCATION: The patient would like to avoid surgery.  -The patient was in agreement with the above plan.  All questions were  answered.    6.  FOLLOW-UP: I will see the patient back in the clinic in 6 weeks to follow-up.  If she has any questions or concerns in the meantime, she should not hesitate to contact our clinic.    Subjective:     Ellie Cooper is a 31 y.o. female who presents today for follow-up regarding right low back pain with radiation into the right lower extremity with associated numbness and tingling.  I last saw the patient on May 25, 2017.  At that time I recommended that the patient continue physical therapy as she did not wish to pursue an additional injection.  The patient reports that she has not had any improvement in her pain since she was last seen.    The patient continues to complain of right-sided low back pain.  Pain radiates into the right buttock, down the right posterior lateral thigh, into the posterior lateral calf to the heel and ankle.  The patient rates her pain today as a 6 out of 10.  At its best it is a 4 out of 10.  At its worst it is a 10 out of 10.  The patient pain is aggravated with prolonged standing and sitting.  Her pain is alleviated temporarily with repositioning.  She denies any new symptoms since she was last seen.  She has intermittent numbness and tingling in the same distribution as her pain.  She denies weakness.    The patient is using gabapentin 100 mg at bedtime.  On the weekends she takes an additional dose during the day.  She does not tolerate taking a higher dose due to drowsiness.  She also uses diclofenac 50 mg once or twice daily.  The patient is currently in physical therapy at [facility].  She requests a referral to a different physical therapy location.    Past medical history is reviewed and is unchanged in the interim.    Family history is reviewed and is unchanged in the interim.    Review of Systems:  Positive for numbness/tingling, headache, dizziness, blurry vision.  Negative for loss of bowel/bladder control, footdrop, weakness, nausea/vomiting, balance changes.      Objective:   CONSTITUTIONAL:  Vital signs as above.  No acute distress.  The patient is well nourished and well groomed.    PSYCHIATRIC:  The patient is awake, alert, oriented to person, place and time.  The patient is answering questions appropriately with clear speech.  Normal affect.  HEENT: Normocephalic, atraumatic.  Sclera clear.    SKIN:  Skin over the face, posterior torso, bilateral upper and lower extremities is clean, dry, intact without rashes.  MUSCULOSKELETAL:  Gait is non-antalgic.     The patient has 5/5 strength for the bilateral hip flexors, knee flexors/extensors, ankle dorsiflexors/plantar flexors, ankle evertors/invertors.    NEUROLOGICAL:    Sensation to light touch is intact in the bilateral L4, L5, and S1 dermatomes.       RESULTS:  MRI lumbar spine from [facility] dated February 17, 2017 was reviewed. This shows a diffuse disc bulge at L5-S1 eccentric to the right which results in right lateral recess stenosis and impingement upon the right S1 nerve root. There is also mild bilateral foraminal stenosis at this level.

## 2021-06-12 NOTE — PROGRESS NOTES
Assessment:   Ellie Cooper is a 31 y.o. y.o. female with past medical history significant for depression who presents today for follow-up regarding low back pain with radiation to the right lower extremity with associated numbness and tingling.  MRI of lumbar spine shows a broad-based L5-S1 disc bulge eccentric to the right resulting in lateral recess stenosis affecting the right S1 nerve root.  The patient status post a right S1-S2 transforaminal epidural steroid injection on March 15, 2017 which only provided 50% relief of her pain for 2 weeks.  When I last saw the patient on July 13, 2017 I referred her to a new location for physical therapy per the patient's request.  She did not start that physical therapy as she states that she was not called to schedule.         Plan:     A shared decision making plan was used.  The patient's values and choices were respected.  The following represents what was discussed and decided upon by the physician assistant and the patient.  A professional  is present for the visit.    1.  DIAGNOSTIC TESTS: I reviewed the MRI lumbar spine.  No further diagnostic tests were ordered.    2.  PHYSICAL THERAPY: I placed an order for the patient begin physical therapy through [facility].  I would like the patient to see 1 of the physical therapist who does more manual treatments, such as the physical therapist who used to be at the [place] location.    3.  MEDICATIONS: No changes are made to the patient's medications.  She uses diclofenac at bedtime.  She also uses gabapentin 200 mg at bedtime.    4.  INTERVENTIONS: I offered the patient a right L5-S1 transforaminal epidural steroid injection.  I explained how this would be different than the right S1-S2 transforaminal epidural steroid injection.  The patient indicated she would like to proceed and an order was placed.      5.  PATIENT EDUCATION:  The patient is in agreement with the above plan.  All questions were  answered.      6.  FOLLOW-UP: The patient will return to the clinic for a right L5-S1 transforaminal epidural steroid injection.  She has any questions or concerns in the meantime, she should not hesitate to contact our clinic.    Subjective:     Ellie Cooper is a 31 y.o. female who presents today for follow-up regarding low back pain with radiation to the right lower extremity with associated numbness and tingling.  I last saw the patient on July 13, 2017.  At that time she requested to try physical therapy [facility]  Unfortunately, the patient states that she was never contacted for an appointment at [facility] so she has not had any additional physical therapy.  She denies any change in her symptoms since she was last seen.    The patient claims of right-sided low back pain.  Pain radiates into the right buttock, down the posterior lateral thigh, into the posterior lateral calf.  She has intermittent numbness and tingling in the same distribution as her pain.  She states the right leg occasionally feels generally weaker than the left.  She rates her pain today as a 6 out of 10.  At its best it is a 4 out of 10.  At its worst it is a 10 out of 10.  The patient's pain is aggravated with lifting.  It is alleviated with doing exercises.    The patient has had physical therapy at [facility].  She uses diclofenac 50 mg at bedtime and gabapentin 200 mg at bedtime.    Past medical history is reviewed and is unchanged in the interim.    Family history reviewed and is unchanged in the interim.    Review of Systems:    Positive for numbness/tingling, headache, blurry vision.  Negative loss of bowel/bladder control, footdrop, weakness, dizziness, nausea/vomiting, balance changes.     Objective:   CONSTITUTIONAL:  Vital signs as above.  No acute distress.  The patient is well nourished and well groomed.    PSYCHIATRIC:  The patient is awake, alert, oriented to person, place and time.  The  patient is answering questions appropriately with clear speech.  Normal affect.  HEENT: Normocephalic, atraumatic.  Sclera clear.    SKIN:  Skin over the face, posterior torso, bilateral upper and lower extremities is clean, dry, intact without rashes.  MUSCULOSKELETAL:  Gait is non-antalgic.      The patient has 5/5 strength for the bilateral hip flexors, knee flexors/extensors, ankle dorsiflexors/plantar flexors, ankle evertors/invertors.    NEUROLOGICAL:   Sensation to light touch is intact in the bilateral L4, L5, and S1 dermatomes.       RESULTS:  MRI lumbar spine from [facility] dated February 17, 2017 was reviewed. This shows a diffuse disc bulge at L5-S1 eccentric to the right which results in right lateral recess stenosis and impingement upon the right S1 nerve root. There is also mild bilateral foraminal stenosis at this level.

## 2021-06-12 NOTE — PROGRESS NOTES
Assessment:   Ellie Cooper is a 31 y.o. y.o. female with past medical history significant for depression who presents today for follow-up regarding low back pain with radiation to the right lower extremity with associated numbness and tingling.  MRI of lumbar spine shows a broad-based L5-S1 disc bulge eccentric to the right resulting in lateral recess stenosis affecting the right S1 nerve root.  The patient status post a right L5-S1 transforaminal epidural steroid injection on August 30, 2017.  The patient has not noticed any relief of her pain yet.  She had previously had a right S1-S2 transforaminal epidural steroid injection on March 15, 2017 which only provided 50% relief of her pain for 2 weeks.       Plan:     A shared decision making plan was used.  The patient's values and choices were respected.  The following represents what was discussed and decided upon by the physician assistant and the patient.  A professional  is present for the visit.    1.  DIAGNOSTIC TESTS: I reviewed the MRI lumbar spine.  No further diagnostic tests were ordered.    2.  PHYSICAL THERAPY: When I saw the patient on August 24 I placed an order for the patient to see 1 of the [facility] physical therapist.  The patient is going to [place] for 2 months.  She plans on scheduling physical therapy when she gets back.    3.  MEDICATIONS: No changes are made to the patient's medications.  Uses gabapentin 100 mg at bedtime and diclofenac 50 mg daily.      4.  INTERVENTIONS:   No further interventions were ordered.    5.  PATIENT EDUCATION:  The patient I discussed that if she fails to improve upon her return from [place] with the initiation of physical therapy, I will refer her to a spine surgeon.    6.  FOLLOW-UP: I will see patient back in the clinic in about 3-4 months.  If she has any questions or concerns in the meantime, she should not hesitate to call.    Subjective:     Ellie Cooper is a 31 y.o. female who presents  today for follow-up regarding right low back pain with radiation to the right lower extremity with associated numbness and tingling.  Patient status post a right L5-S1 transforaminal epidural steroid injection on August 30, 2017.  The patient reports this did not provide any relief of her pain.    The patient continues to complain of bilateral low back pain.  Pain radiates in the right buttock and on the posterior lateral thigh, into the posterior lateral calf, and into the foot.  She rates her pain today as a 6 out of 10.  At its best it is a 4 out of 10.  At its worst it is a 10 out of 10.  The patient's pain is aggravated with lifting and alleviated with stretching.  She has numbness and tingling in the same distribution as her pain.  She feels that the right leg is weaker than the left.  She denies any new symptoms and she was last seen.    The patient has had physical therapy.  I had placed an order for the patient to return to physical therapy when I saw her on August 24.  She has not yet scheduled that.  She is going to [place] for 2 months.  She plans on scheduling that when she returns.  She uses gabapentin 100 mg at bedtime and diclofenac 50 mg daily.    Past medical history is reviewed and is unchanged in the interim.    Family history is reviewed and is unchanged in the interim.    Review of Systems:  Positive for numbness/tingling, weakness, headache, blurry vision.  Negative loss of bowel/bladder control, footdrop, dizziness, nausea/vomiting, balance changes.     Objective:   CONSTITUTIONAL:  Vital signs as above.  No acute distress.  The patient is well nourished and well groomed.    PSYCHIATRIC:  The patient is awake, alert, oriented to person, place and time.  The patient is answering questions appropriately with clear speech.  Normal affect.  HEENT: Normocephalic, atraumatic.  Sclera clear.    SKIN:  Skin over the face, posterior torso, bilateral upper and lower extremities is clean, dry, intact  without rashes.  MUSCULOSKELETAL:  Gait is non-antalgic.      The patient has 5/5 strength for the bilateral hip flexors, knee flexors/extensors, ankle dorsiflexors/plantar flexors, ankle evertors/invertors.    NEUROLOGICAL: 2+ patellar, 1+ Achilles reflexes which are symmetric bilaterally.  No ankle clonus bilaterally.  Subjective diminished sensation in the right L4, L5, and S1 dermatome.    RESULTS:  MRI lumbar spine from [facility] dated February 17, 2017 was reviewed. This shows a diffuse disc bulge at L5-S1 eccentric to the right which results in right lateral recess stenosis and impingement upon the right S1 nerve root. There is also mild bilateral foraminal stenosis at this level.

## 2021-06-15 PROBLEM — M51.16 LUMBAR DISC HERNIATION WITH RADICULOPATHY: Status: ACTIVE | Noted: 2017-03-07

## 2021-06-15 PROBLEM — Z32.01 POSITIVE PREGNANCY TEST: Status: ACTIVE | Noted: 2017-04-05

## 2021-06-16 NOTE — PROGRESS NOTES
Assessment:   Ellie Cooper is a 32 y.o. y.o. female with past medical history significant for depression who presents today for follow-up regarding a two-week flareup of chronic right low back pain with radiation into the right lower extremity with associated numbness and tingling.  MRI lumbar spine shows a broad-based L5-S1 disc bulge eccentric to the right resulting in lateral recess stenosis affecting the right S1 nerve root.  The patient status post a right S1-S2 transforaminal epidural steroid injection on March 15, 2017 which only provided 50% relief of her pain for 2 weeks.  Right L5-S1 transforaminal epidural steroid injection on August 17, 2017 was not helpful.  The patient demonstrated a sensory deficit in the right L4, L5, and S1 dermatome on exam today, but is otherwise neurologically intact.       Plan:     A shared decision making plan was used.  The patient's values and choices were respected.  The following represents what was discussed and decided upon by the physician assistant and the patient.  A professional  is present for the visit .      1.  DIAGNOSTIC TESTS: I reviewed the MRI lumbar spine.  No further diagnostic tests were ordered.    2.  PHYSICAL THERAPY: I placed an order for the patient begin physical therapy.  I emphasized the importance of this.  The patient has not had physical therapy for her lower back since 2013 or 2014, despite several referrals from me.    3.  MEDICATIONS:    -I prescribed a Medrol Dosepak for the patient.  -I asked the patient to stop using diclofenac while she is using a Medrol Dosepak.  When she completes the Medrol Dosepak, she can resume diclofenac.  -Methocarbamol 500 mg 3 times daily as needed was prescribed.  -The patient can continue using gabapentin 200 mg 3 times daily.    4.  INTERVENTIONS: No interventions were ordered.  If the patient fails to improve, we could consider a repeat right S1-S2 transforaminal epidural steroid injection.  She  had this injection in March 2017 and it only provided relief of her pain for 2 weeks.  I cannot guarantee that it would be more effective than that.    5.  PATIENT EDUCATION: I offered to refer the patient back to spine surgery.  The patient previously saw Dr. Graham who recommended an epidural steroid injection.  The patient is not interested in a surgical referral at this time.  She is concerned about the potential risks of surgery.  Will discuss this further if she fails to improve with conservative treatment.  -The patient is in agreement with the above plan.  All questions were answered.    6.  FOLLOW-UP: I will see the patient back in the clinic in 2 weeks to follow-up.  If she has any questions or concerns in the meantime, she should not hesitate contact our clinic.    Subjective:     Ellie Cooper is a 32 y.o. female who presents today for follow-up regarding a two-week flareup of right low back pain with radiation into the right lower extremity with associated numbness and tingling.  I last saw the patient on September 8, 2017.  At that time I recommended that the patient try physical therapy.  The patient did not go to physical therapy.  The patient states that her pain flared up 2 weeks ago.  She states that she was at a wedding and she had to prepare food and do lots of walking and lifting.  This is the same pain that she had previously, but more severe.  She states that she is having more significant shooting pain in the right buttock and posterior thigh than she did previously.    The patient was arrested low back pain.  The pain radiates in the right buttock, down the posterior thigh, into the posterior calf, and into the plantar foot.  She states that reaches all 5 toes.  She has intermittent numbness and tingling this exacerbation as her pain.  She denies weakness.  She rates her pain today as a 6 out of 10.  At its best it is a 6 out of 10.  At its worst it is a 10 out of 10.  The patient's pain is  aggravated with prolonged sitting and standing and twisting.  It is alleviated with massage.    The patient tried chiropractor last week.  This was not helpful.  As mentioned above, she did not go to the physical therapy that I recommended.  The patient is using gabapentin 200 mg 3 times daily.  Uses diclofenac 50 mg 3 times daily.  She also uses a TENS unit as needed.    Past medical history is reviewed and is pertinent for starting physical therapy for TMJ at [facility].    Family history is reviewed and is unchanged in the interim.    Review of Systems:  Positive for numbness/tingling.  Negative for loss of bowel/bladder control, footdrop, weakness, headache, dizziness, nausea/vomiting, blurry vision, balance changes.     Objective:   CONSTITUTIONAL:  Vital signs as above.  No acute distress.  The patient is well nourished and well groomed.    PSYCHIATRIC:  The patient is awake, alert, oriented to person, place and time.  The patient is answering questions appropriately with clear speech.  Normal affect.  HEENT: Normocephalic, atraumatic.  Sclera clear.    SKIN:  Skin over the face, posterior torso, bilateral upper and lower extremities is clean, dry, intact without rashes.  MUSCULOSKELETAL:  Gait is non-antalgic.  The patient is able to heel and toe walk without any difficulty.  Able to single leg toe raise ×5 on the right.  Mild tenderness over the right lower lumbar paraspinal muscles.      The patient has 5/5 strength for the bilateral hip flexors, knee flexors/extensors, ankle dorsiflexors/plantar flexors, ankle evertors/invertors.    NEUROLOGICAL: 2+ patellar, achilles reflexes which are symmetric bilaterally.  No ankle clonus bilaterally.  Subjective diminished sensation in the right L4, L5, and S1 dermatomes.     RESULTS:  MRI lumbar spine from [facility] dated February 17, 2017 was reviewed. This shows a diffuse disc bulge at L5-S1 eccentric to the right which results in right lateral recess stenosis  and impingement upon the right S1 nerve root. There is also mild bilateral foraminal stenosis at this level.

## 2021-06-16 NOTE — PROGRESS NOTES
Assessment and Plan     Ellie was seen today for back pain.    Diagnoses and all orders for this visit:    Chronic right-sided low back pain with right-sided sciatica  -     diclofenac sodium (VOLTAREN) 1 % Gel; Apply to your lower back 4 times per day as needed.  -     capsaicin 0.1 % Crea; Apply up to 4 times per day as needed for pain.    Other orders  -     Nursing communication       HPI     Chief Complaint   Patient presents with     Back Pain     x1 week, pain got worse over the weekend       Ellie Cooper is a 32 y.o. female seen today for right-sided LBP similar to the pain she has been having for the last 10 years.  It radiates down through her right buttock into her right thigh and calf.  She has numbness and paresthesia on the lateral portion of her right thigh, in the same distribution as her previous episodes of radicular pain.  It is, however,  worse than it has been.  She acknowledges that she spent most of the weekend on her feet cooking and is concerned that this may have aggravated her pain.  She denies incontinence of bowel or bladder.  Denies saddle paresthesias.  Denies fevers or chills.    She did call the spine clinic to see her neurologist, however their offices had just closed.  She does intend to call them tomorrow and see them as soon as possible.    [place] [facility] [place]  She does not speak English, however she did bring an agency  with her.     Current Outpatient Prescriptions   Medication Sig Dispense Refill     gabapentin (NEURONTIN) 100 MG capsule Take 100 mg by mouth at bedtime.  2     amoxicillin (AMOXIL) 875 MG tablet Take 875 mg by mouth 2 (two) times a day.  0     capsaicin 0.1 % Crea Apply up to 4 times per day as needed for pain. 1 Tube 0     diclofenac (VOLTAREN) 50 MG EC tablet Take 1 tablet (50 mg total) by mouth 3 (three) times a day as needed. 60 tablet 2     diclofenac sodium (VOLTAREN) 1 % Gel Apply to your lower back 4 times per day as needed.  "1 Tube 0     TENS unit electrodes 2X2 \" Pads Use as directed by physical therapist 30 each 5     TENS units Jia Use as directed by physical therapist 1 Device 0     No current facility-administered medications for this visit.         Reviewed and updated: medical history, medications and allergies.     Review of Systems     General: Denies fever, chills, fatigue.  Neuro: LBP with shooting pains down right leg, similar to her pre-existing radicular LBP.     Objective     Vitals:    03/20/18 1723   BP: 110/72   Patient Site: Right Arm   Patient Position: Sitting   Cuff Size: Adult Regular   Pulse: 63   Resp: 20   Temp: 97.7  F (36.5  C)   TempSrc: Oral   SpO2: 99%   Weight: 115 lb 7 oz (52.4 kg)        Reviewed vital signs.  General: Appears calm, comfortable. Answers questions quickly and appropriately with clear speech. No apparent distress.  Skin: Pink, warm, dry.  HENT: Normocephalic, atraumatic.  Neck: Supple.  Heart: Strong, regular radial pulse.  Lungs: Normal respiratory effort.  Neuro: Memory and cognition appear normal. Normal gait.   Psych: Mood and affect appear normal.   MSK - Back: No tenderness or deformity on palpation of her spine.  There is tenderness in the paraspinous muscles to the right of her spine at approximately the L5-L6 region.  There is numbness and tingling in the lateral portion of her right thigh.  5/5 dorsiflexion and plantarflexion in her right and left great toe.  Positive right straight leg raise and crossed straight leg raise.     Medical Decision-Making     Ellie is a well-appearing 32-year-old female who presents with an exacerbation of her chronic radicular LBP.  She denies any recent trauma or injury.  Although she acknowledges she did spend a great deal of time on her feet over the weekend and is concerned that this may have caused exacerbation.  She continues to take her diclofenac and gabapentin as prescribed.  She will be seeing her neurologist hopefully later this week, " I do not intend to make any large changes to her medication regimen.  Instead I recommended a few topical products including diclofenac and capsaicin which may provide some symptomatic relief until she can see her neurologist.    Reviewed red flags that would trigger a prompt return to the clinic as noted below under patient instructions.  She expressed understanding of these directions and is in agreement with the plan.     Patient Instructions     Patient Instructions   You are having back pain related to the spine and back problems you've had for 10 years.     I'm prescribing two medicines that may help you be more comfortable for a short while.    Please follow up with your spine doctor as soon as you can.    Please return to the clinic if you notice any of the following:    Fever / chills.    Numbness in your crotch.    Difficulty controlling your bowels or urine.        Discussed benefit vs risk of medications, dosing, side effects.  Patient was able to verbalize understanding.  After visit summary was provided for patient.     Sebas Hair PA-C

## 2021-06-17 NOTE — PROGRESS NOTES
Assessment:   Ellie Cooper is a 32 y.o. y.o. female with past medical history significant for depression who presents today for follow-up regarding a flareup of chronic right low back pain with radiation into the right lower extremity with associated numbness and tingling.  MRI lumbar spine shows a broad-based L5-S1 disc bulge eccentric to the right resulting in lateral recess stenosis affecting the right S1 nerve root.  The patient has significant relief of her pain while taking a Medrol Dosepak, but since finishing the Medrol Dosepak, her pain has returned.       Plan:     A shared decision making plan was used.  The patient's values and choices were respected.  The following represents what was discussed and decided upon by the physician assistant and the patient.  A professional  is present for the visit.    1.  DIAGNOSTIC TESTS: I reviewed the MRI lumbar spine.  No further diagnostic tests were ordered.      2.  PHYSICAL THERAPY: I had referred the patient to physical therapy when I saw her on March 26.  The patient would like to attend [facility] [place].  The patient states that she was never contacted by [facility] to schedule an appointment.  We will re-fax the order to [facility].  We also provided the patient with a paper copy of her referral so that she can take it to [facility].    3.  MEDICATIONS:    -I provided a repeat Medrol Dosepak for the patient.  The patient did have significant relief of her pain while she was taking the Medrol Dosepak but when she stopped taking the medication, her pain returned.  Hopefully a repeat course will provide longer lasting relief.  -After she completes the repeat Medrol Dosepak, she could resume diclofenac.  -The patient can continue using methocarbamol 500 mg daily.  -The patient should continue using gabapentin 200 mg 3 times daily.    4.  INTERVENTIONS: If pain fails to improve with a repeat Medrol Dosepak, I will recommend that we try a right S1-S2 transforaminal  epidural steroid injection.  She had this injection previously on March 15, 2017 and provided 50% relief of her pain but only lasted 2 weeks.  A right L5-S1 transforaminal epidural steroid injection on August 17, 2017 did not provide any relief of her pain.    5.  PATIENT EDUCATION: I told the patient that if pain is refractory to conservative treatment, I will refer her back to spine surgeon.  She saw Dr. Graham in March 2017 and Dr. Graham recommended conservative treatment at that time.  -Patient is in agreement with the above plan.  All questions were answered.    6.  FOLLOW-UP: I will see the patient back in the clinic in about 2 weeks.  If she has any questions or concerns in the meantime, she should not hesitate contact our clinic.    Subjective:     Ellie Cooper is a 32 y.o. female who presents today for follow-up regarding a flareup of right low back pain with radiation into the right lower extremity with numbness and tingling.  I saw the patient on March 20 6/7/2018.  At that time I referred the patient to physical therapy.  The patient states that she was not contacted by physical therapy so this has not yet started.  I also prescribed a Medrol Dosepak for the patient.  The patient states that she has significant relief of her pain while she was taking the Medrol Dosepak.  She states that the pain did not go away completely, but she was able to do her normal daily activities with much less discomfort.  Once she stopped the Medrol Dosepak, her same pain returned.    The patient complains of pain which begins in the right buttock.  The pain shoots down the posterior thigh and into the posterior calf.  The patient has pain and numbness and tingling in the toes of the right feet.  She denies weakness.  She rates her pain today as a 4-10.  At its best it is a 4 out of 10.  At its worst it is an 8 out of 10.  The patient's pain is aggravated with lying on her back and lying on her stomach.  It is alleviated  with massage.    As mentioned above, the patient did not go to the physical therapy that I prescribed.  The patient is currently using gabapentin 200 mg 3 times daily and methocarbamol 500 mg daily.  She did not resume diclofenac after completion of the Medrol Dosepak because she did not know that she was supposed to.    Past medical history is reviewed and is unchanged in the interim.    Family history is reviewed and is unchanged in the interim.    Review of Systems:  Positive for numbness/tingling, footdrop.  Negative for loss of bowel/bladder control, weakness, headache, dizziness, nausea/vomiting, blurry vision, balance changes.     Objective:   CONSTITUTIONAL:  Vital signs as above.  No acute distress.  The patient is well nourished and well groomed.    PSYCHIATRIC:  The patient is awake, alert, oriented to person, place and time.  The patient is answering questions appropriately with clear speech.  Normal affect.  HEENT: Normocephalic, atraumatic.  Sclera clear.    SKIN:  Skin over the face, posterior torso, bilateral upper and lower extremities is clean, dry, intact without rashes.  MUSCULOSKELETAL:  Gait is mildly antalgic, favoring the right.  The patient is able to heel and toe walk without any difficulty. The patient has 5/5 strength for the bilateral hip flexors, knee flexors/extensors, ankle dorsiflexors/plantar flexors, ankle evertors/invertors.    NEUROLOGICAL: 2+ patellar, achilles reflexes which are symmetric bilaterally.  No ankle clonus bilaterally.  Subjective diminished/altered sensation in the right L4, L5, and S1 dermatomes.     RESULTS:  MRI lumbar spine [facility] dated February 17, 2017 was reviewed. This shows a diffuse disc bulge at L5-S1 eccentric to the right which results in right lateral recess stenosis and impingement upon the right S1 nerve root. There is also mild bilateral foraminal stenosis at this level.

## 2021-06-18 NOTE — PROGRESS NOTES
Assessment:   Ellie Cooper is a 32 y.o. y.o. female with past medical history significant for depression who presents today for follow-up regarding chronic right low back pain with radiation into the right lower extremity with associated numbness and tingling in the distribution of the right S1 nerve root.  MRI lumbar spine shows a broad-based L5-S1 disc bulge eccentric to the right resulting in lateral recess stenosis affecting the right S1 nerve root.  Pain is been persistent despite Medrol Dosepak ×2.       Plan:     A shared decision making plan was used.  The patient's values and choices were respected.  The following represents what was discussed and decided upon by the physician assistant and the patient.  A professional  is present for the visit.    1.  DIAGNOSTIC TESTS: I reviewed the MRI lumbar spine.  No further diagnostic tests were ordered.    2.  PHYSICAL THERAPY: I had referred the patient to physical therapy when I saw her on March 26.  When she returned on April 18, she states that she was never called for physical therapy.  Today, she states that she had a family emergency so she was not able to attend.  I again encouraged her to follow through with this.  I stressed the importance of physical therapy.  She states that she will call to schedule.    3.  MEDICATIONS: No changes are made to the patient's medications.  The patient completed her repeat Medrol Dosepak.  She did not feel that this provided any relief of her pain.  She is currently taking diclofenac 3 times daily.  She is also taking gabapentin 200 mg 3 times daily.  She did not tolerate titrating this up to 300 mg 3 times daily due to drowsiness.  She stopped taking methocarbamol.  She did not feel was helpful.    4.  INTERVENTIONS: I offered a repeat right S1-S2 transforaminal epidural steroid injection.  The patient previously had this injection on March 15, 2017 and provided 50% relief of her pain but only lasted 2 weeks.   I told the patient I cannot guarantee that it would provide longer relief, but if she wants to try additional treatment start of surgery, we can consider this.  The patient declined.    5.  PATIENT EDUCATION: I offered the patient a referral to neurosurgery.  Patient saw Dr. Graham in March 2017 and Dr. Graham recommended conservative treatment at that time.  I told the patient that since her pain has been refractory to conservative treatment, it would be reasonable to follow-up with neurosurgery.  She declined.    6.  FOLLOW-UP: I offered follow up with the patient in another 4 weeks to monitor her progress with physical therapy.  She declined.  She will follow-up on an as-needed basis.  If she has any questions or concerns, she should not hesitate to call.    Subjective:     Ellie Cooper is a 32 y.o. female who presents today for follow-up regarding chronic right low back pain with radiation into the right lower cavity with associated numbness and tingling.  I last saw the patient on April 18, 2018.  At that time I provided a repeat Medrol Dosepak for the patient to treat a flareup of her pain.  The patient states that this was not helpful.  I also encouraged the patient to schedule her physical therapy that I had previously referred her for.  The patient states that she had a family emergency so she was not able to go to physical therapy.  She states that her pain is getting progressively worse.    The patient planes of right low back pain.  Pain radiates into the right buttock, down the posterior thigh, into the posterior calf, extending into the plantar and lateral foot.  She has numbness and tingling in the same distribution as her pain all the way into the foot.  He feels weaker in the right leg compared to the left.  She rates her pain today as an 8-9 out of 10.  At its best it is a 4 out of 10.  At its worst it is a 10 out of 10.  Patient states that pain is aggravated with walking.  Sitting helps to  alleviate the pain.  She describes the pain as a shooting and poking pain.  She denies any new symptoms and she was last seen.    As mentioned above, patient did not go to physical therapy.  She completed her Medrol Dosepak which was not helpful.  She resume diclofenac 3 times daily.  She is also taking gabapentin 200 mg 3 times daily.    Past medical history is reviewed and is unchanged in the interim.    Family history is reviewed and is unchanged in the interim.    Review of Systems:  Positive for numbness/tingling, weakness, headache.  Negative for loss of bowel/bladder control, footdrop, dizziness, nausea/vomiting, blurry vision, balance changes.     Objective:   CONSTITUTIONAL:  Vital signs as above.  No acute distress.  The patient is well nourished and well groomed.    PSYCHIATRIC:  The patient is awake, alert, oriented to person, place and time.  The patient is answering questions appropriately with clear speech.  Normal affect.  HEENT: Normocephalic, atraumatic.  Sclera clear.    SKIN:  Skin over the face, posterior torso, bilateral upper and lower extremities is clean, dry, intact without rashes.  MUSCULOSKELETAL:  Gait is mildly antalgic, favoring the right.  The patient is able to heel and toe walk without any difficulty.  Mild tenderness over the right lower lumbar paraspinal muscles.      The patient has 5/5 strength for the bilateral hip flexors, knee flexors/extensors, ankle dorsiflexors/plantar flexors, ankle evertors/invertors.    NEUROLOGICAL: 1+ patellar, achilles reflexes which are symmetric bilaterally.  No ankle clonus bilaterally.  Subjective diminished/altered sensation right S1 greater than right L5 dermatomes.     RESULTS:  MRI lumbar spine from [facility] dated February 17, 2017 was reviewed. This shows a diffuse disc bulge at L5-S1 eccentric to the right which results in right lateral recess stenosis and impingement upon the right S1 nerve root. There is also mild bilateral  foraminal stenosis at this level.

## 2021-09-21 ENCOUNTER — NURSE TRIAGE (OUTPATIENT)
Dept: NURSING | Facility: CLINIC | Age: 36
End: 2021-09-21

## 2021-09-21 NOTE — TELEPHONE ENCOUNTER
"Vega = Cameron  on phone line with pt to facilitate triage.    Pt used home pregnancy test.  Positive result -> two weeks ago.  LMP July 17, 2021.    Pt reports \"nausea, vomiting, headache.\"  Onset ten days ago.  \"Not as severe at first.\"  \"Worse than before.\"    Pt now reports being in [place] currently.    Advised pt to call customer service # on back of health insurance card for advice on urgent cares and/or EDs in her locale.  Explained health insurance company may also have a triage service.    Pt verbalizes understanding after lengthy discussion.  Agrees to seek care in her current locale.    [facility] Nurse Advisor     "

## 2021-09-22 ENCOUNTER — NURSE TRIAGE (OUTPATIENT)
Dept: NURSING | Facility: CLINIC | Age: 36
End: 2021-09-22

## 2021-09-22 NOTE — TELEPHONE ENCOUNTER
Patient was transferred to [facility] with a [facility]  on the phone line already.     Patient wants to make an appointment to establish care with OB provider.   Patient used a home pregnancy test and it was positive.   LMP July 20-21st 5th pregnancy     RN did speak with central scheduling who states patient needs to call the OB clinic directly in order to schedule the first prenatal appointment.   RN did give the patient [facility] phone number- [phone]. Advised patient to call tomorrow after 8 am. Patient verbalized understanding and had no further questions.    Chitra Mckeon RN/[facility] Nurse Advisors      COVID 19 Nurse Triage Plan/Patient Instructions    Please be aware that novel coronavirus (COVID-19) may be circulating in the community. If you develop symptoms such as fever, cough, or SOB or if you have concerns about the presence of another infection including coronavirus (COVID-19), please contact your health care provider or visit [email or web address].     Disposition/Instructions    In-Person Visit with provider recommended. Reference Visit Selection Guide.    Thank you for taking steps to prevent the spread of this virus.  o Limit your contact with others.  o Wear a simple mask to cover your cough.  o Wash your hands well and often.    Resources    [facility]: About COVID-19: [email or web address]    CDC: What to Do If You're Sick: www.cdc.gov/coronavirus/2019-ncov/about/steps-when-sick.html    CDC: Ending Home Isolation: www.cdc.gov/coronavirus/2019-ncov/hcp/disposition-in-home-patients.html     CDC: Caring for Someone: www.cdc.gov/coronavirus/2019-ncov/if-you-are-sick/care-for-someone.html     [facility]: Interim Guidance for Hospital Discharge to Home: www.health.[facility].[place]/diseases/coronavirus/hcp/hospdischarge.pdf    [facility] clinical trials (COVID-19 research studies): clinicalaffairs.[facility]/n-clinical-trials     Below are the  "COVID-19 hotlines at the [place] Department of [facility] ([facility]). Interpreters are available.   o For health questions: Call 996-335-3680 or 1-766.746.7204 (7 a.m. to 7 p.m.)  o For questions about schools and childcare: Call 452-285-3909 or 1-400.874.8611 (7 a.m. to 7 p.m.)     Reason for Disposition    Pregnant    Additional Information    Negative: Sounds like a life-threatening emergency to the triager    Negative: [1] Pregnant AND [2] has IUD    Negative: Wants a pregnancy test done in the office    Negative: [1] Pregnant AND [2] history of infertility    Negative: [1] Pregnant AND [2] prior history of \"ectopic pregnancy\" or previous tubal surgery (e.g., tubal ligation)    Protocols used: MENSTRUAL PERIOD - MISSED OR LATE-A-AH      "

## 2021-09-28 ENCOUNTER — NURSE TRIAGE (OUTPATIENT)
Dept: NURSING | Facility: CLINIC | Age: 36
End: 2021-09-28

## 2021-09-28 NOTE — TELEPHONE ENCOUNTER
Cameron  on line.    Pt had a recent miscarriage on 9/26/2021.  Pt wants to know how long she should wait to start birth control?  Pt did not see anyone following her miscarriage.  Pt denies any ongoing abdominal pain, and she said that her nausea and vomiting has decreased (morning sickness).    Pt advised to schedule an appointment with an OB/Gyn for an examination, and to discuss birth control in further detail.  Pt transferred to scheduling to set up this appointment.  Carly Espinoza RN 09/28/21 4:29 PM  [facility] Nurse Advisor    COVID 19 Nurse Triage Plan/Patient Instructions    Please be aware that novel coronavirus (COVID-19) may be circulating in the community. If you develop symptoms such as fever, cough, or SOB or if you have concerns about the presence of another infection including coronavirus (COVID-19), please contact your health care provider or visit [email or web address].     Disposition/Instructions    In-Person Visit with provider recommended. Reference Visit Selection Guide.    Thank you for taking steps to prevent the spread of this virus.  o Limit your contact with others.  o Wear a simple mask to cover your cough.  o Wash your hands well and often.    Resources    [facility]: About COVID-19: [email or web address]    CDC: What to Do If You're Sick: www.cdc.gov/coronavirus/2019-ncov/about/steps-when-sick.html    CDC: Ending Home Isolation: www.cdc.gov/coronavirus/2019-ncov/hcp/disposition-in-home-patients.html     CDC: Caring for Someone: www.cdc.gov/coronavirus/2019-ncov/if-you-are-sick/care-for-someone.html     [facility]: Interim Guidance for Hospital Discharge to Home: www.health.[facility].[place]/diseases/coronavirus/hcp/hospdischarge.pdf    [facility] clinical trials (COVID-19 research studies): clinicalaffairs.[facility]/n-clinical-trials     Below are the COVID-19 hotlines at the [place] Department [facility] ([facility]). Interpreters are available.    o For health questions: Call 938-924-8207 or 1-158.330.2248 (7 a.m. to 7 p.m.)  o For questions about schools and childcare: Call 656-039-6203 or 1-551.201.3102 (7 a.m. to 7 p.m.)

## 2021-10-04 ENCOUNTER — OFFICE VISIT (OUTPATIENT)
Dept: FAMILY MEDICINE | Facility: CLINIC | Age: 36
End: 2021-10-04
Payer: COMMERCIAL

## 2021-10-04 ENCOUNTER — TRANSFERRED RECORDS (OUTPATIENT)
Dept: HEALTH INFORMATION MANAGEMENT | Facility: CLINIC | Age: 36
End: 2021-10-04

## 2021-10-04 VITALS
HEART RATE: 72 BPM | OXYGEN SATURATION: 99 % | SYSTOLIC BLOOD PRESSURE: 116 MMHG | DIASTOLIC BLOOD PRESSURE: 80 MMHG | BODY MASS INDEX: 26.44 KG/M2 | WEIGHT: 127.6 LBS | TEMPERATURE: 97.2 F | RESPIRATION RATE: 24 BRPM

## 2021-10-04 DIAGNOSIS — R10.32 LLQ ABDOMINAL PAIN: ICD-10-CM

## 2021-10-04 DIAGNOSIS — Z30.9 ENCOUNTER FOR CONTRACEPTIVE MANAGEMENT, UNSPECIFIED TYPE: ICD-10-CM

## 2021-10-04 DIAGNOSIS — O03.9 MISCARRIAGE: Primary | ICD-10-CM

## 2021-10-04 PROBLEM — Z79.1 LONG TERM (CURRENT) USE OF NON-STEROIDAL ANTI-INFLAMMATORIES (NSAID): Status: ACTIVE | Noted: 2021-10-04

## 2021-10-04 LAB
BASOPHILS # BLD AUTO: 0 10E3/UL (ref 0–0.2)
BASOPHILS NFR BLD AUTO: 0 %
EOSINOPHIL # BLD AUTO: 0.1 10E3/UL (ref 0–0.7)
EOSINOPHIL NFR BLD AUTO: 1 %
ERYTHROCYTE [DISTWIDTH] IN BLOOD BY AUTOMATED COUNT: 11.4 % (ref 10–15)
HCG SERPL-ACNC: 537 MLU/ML (ref 0–4)
HCT VFR BLD AUTO: 43.6 % (ref 35–47)
HGB BLD-MCNC: 14.2 G/DL (ref 11.7–15.7)
IMM GRANULOCYTES # BLD: 0 10E3/UL
IMM GRANULOCYTES NFR BLD: 0 %
LYMPHOCYTES # BLD AUTO: 2.3 10E3/UL (ref 0.8–5.3)
LYMPHOCYTES NFR BLD AUTO: 28 %
MCH RBC QN AUTO: 28.9 PG (ref 26.5–33)
MCHC RBC AUTO-ENTMCNC: 32.6 G/DL (ref 31.5–36.5)
MCV RBC AUTO: 89 FL (ref 78–100)
MONOCYTES # BLD AUTO: 0.9 10E3/UL (ref 0–1.3)
MONOCYTES NFR BLD AUTO: 10 %
NEUTROPHILS # BLD AUTO: 4.9 10E3/UL (ref 1.6–8.3)
NEUTROPHILS NFR BLD AUTO: 60 %
PLATELET # BLD AUTO: 102 10E3/UL (ref 150–450)
RBC # BLD AUTO: 4.92 10E6/UL (ref 3.8–5.2)
WBC # BLD AUTO: 8.2 10E3/UL (ref 4–11)

## 2021-10-04 PROCEDURE — 36415 COLL VENOUS BLD VENIPUNCTURE: CPT | Performed by: FAMILY MEDICINE

## 2021-10-04 PROCEDURE — 99204 OFFICE O/P NEW MOD 45 MIN: CPT | Performed by: FAMILY MEDICINE

## 2021-10-04 PROCEDURE — 84702 CHORIONIC GONADOTROPIN TEST: CPT | Performed by: FAMILY MEDICINE

## 2021-10-04 PROCEDURE — 85025 COMPLETE CBC W/AUTO DIFF WBC: CPT | Performed by: FAMILY MEDICINE

## 2021-10-04 NOTE — PROGRESS NOTES
Assessment & Plan     ICD-10-CM    1. Miscarriage  O03.9 US Pelvic Complete with Transvaginal     CBC with platelets and differential     HCG qualitative   2. LLQ abdominal pain  R10.32    3. Encounter for contraceptive management, unspecified type  Z30.9      Decision making: Patient is a G6 who presents today for what appears to be a miscarriage.  Unsure if it is complete.  Her symptoms are less though she does continue to bleed.  Will obtain ultrasound as above and a quant titer.  Regarding birth control, at this time I advise abstinence and I will see her soon to discuss options.  Warning signs and symptoms discussed regarding miscarriage to seek emergent care.  Reviewed patient's chart and apart from phone call to triage nurses, patient has not been seen for this pregnancy.  This appears to be her first trimester loss.  Printed education materials provided to the patient to seek emergent help.  Interview done with the help of .  Explained that the printed material is in English.  Patient verbalizes understanding and will have family interpret as needed.    }     See Patient Instructions    Return in about 2 weeks (around 10/18/2021) for Follow up.    Nicol Pacheco MD  [facility]    Subjective    Chief Complaint   Patient presents with     Miscarriage     Follow up on miscarriage.  Abdominal cramping and pain on left side since the miscarriage.   Would like to discuss birh control options.       Ellie is a 35 year old who presents for the following health issues     HPI : Patient reports she was pregnant and had a miscarriage.  LMP- 07/17/2021  Started bleeding 09/26/2021. Initially it was heavy.   It contained the conceptus.  Still bleeding and changing pad every 3-4 hours.  LLQ pain, no cramps, small clots      Review of Systems   Constitutional, HEENT, cardiovascular, pulmonary, gi and gu systems are negative, except as otherwise noted.      Objective    BP  116/80   Pulse 72   Temp 97.2  F (36.2  C) (Oral)   Resp 24   Wt 57.9 kg (127 lb 9.6 oz)   SpO2 99%   BMI 26.44 kg/m    Body mass index is 26.44 kg/m .  Physical Exam   GENERAL: healthy, alert and no distress  NECK: no adenopathy, no asymmetry, masses, or scars and thyroid normal to palpation  RESP: lungs clear to auscultation - no rales, rhonchi or wheezes  CV: regular rate and rhythm, normal S1 S2, no S3 or S4, no murmur, click or rub, no peripheral edema and peripheral pulses strong  ABDOMEN: soft, nontender, no hepatosplenomegaly, no masses and bowel sounds normal  MS: no gross musculoskeletal defects noted, no edema    No results found for this or any previous visit (from the past 24 hour(s)).

## 2021-10-04 NOTE — PATIENT INSTRUCTIONS
Patient Education     Discharge Instructions for Miscarriage   You have had a miscarriage. This is the unplanned end of a pregnancy before the baby can live outside the uterus. You may have had a shock to your system, both physically and emotionally. Because of this, you may not feel well for a few days. Your body is going through changes. And you can expect mood swings. When you are ready, start back to your normal routine.  Home care  Suggestions for care at home include:    Return to work or your daily routines when you feel ready. This might be right away, or you may want to wait a few days.    Take showers instead of tub baths. This helps prevent infection. Ask your healthcare provider when you can take baths again.    Don't do any strenuous exercise right away, such as aerobics or running. Wait until the bleeding slows to the rate of a normal period.    Don t have sex or use tampons or douches until your provider says it s OK.    Get emotional support. Ask your provider about support groups in your area. Many women find it helpful to talk with other women who have had a miscarriage.  Follow-up  Make a follow-up appointment with your healthcare provider.  When to call your healthcare provider  Call your healthcare provider right away if you have any of the following:    Fever above 100.4 F (38 C) or higher, or as advised by your provider    Chills    Bright red vaginal bleeding or a smelly discharge    Vaginal bleeding that soaks more than 1 menstrual pad per hour    Belly pain that is severe or getting worse  Keri last reviewed this educational content on 6/1/2019 2000-2021 [employer]. All rights reserved. This information is not intended as a substitute for professional medical care. Always follow your healthcare professional's instructions.

## 2021-10-06 ENCOUNTER — HOSPITAL ENCOUNTER (OUTPATIENT)
Dept: ULTRASOUND IMAGING | Facility: HOSPITAL | Age: 36
Discharge: HOME | End: 2021-10-06
Attending: FAMILY MEDICINE | Admitting: FAMILY MEDICINE
Payer: COMMERCIAL

## 2021-10-06 DIAGNOSIS — O03.9 MISCARRIAGE: ICD-10-CM

## 2021-10-06 PROCEDURE — 76801 OB US < 14 WKS SINGLE FETUS: CPT

## 2021-10-07 ENCOUNTER — TELEPHONE (OUTPATIENT)
Dept: FAMILY MEDICINE | Facility: CLINIC | Age: 36
End: 2021-10-07

## 2021-10-07 ENCOUNTER — APPOINTMENT (OUTPATIENT)
Dept: INTERPRETER SERVICES | Facility: CLINIC | Age: 36
End: 2021-10-07
Payer: COMMERCIAL

## 2021-10-07 DIAGNOSIS — O03.9 SPONTANEOUS ABORTION: ICD-10-CM

## 2021-10-07 DIAGNOSIS — L72.3 SEBACEOUS CYST: Primary | ICD-10-CM

## 2021-10-07 NOTE — TELEPHONE ENCOUNTER
----- Message from Nicol Pacheco MD sent at 10/7/2021  7:17 AM CDT -----  Please inform patient that her ultrasound shows that the miscarriage may not be complete.  I have referred her to GYN.  This is to evaluate if we need to wait and watch or do a D&C.  A D&C is usually required if she continues to bleed or the hormone levels do not go down.    Nicol Pacheco MD

## 2021-10-07 NOTE — TELEPHONE ENCOUNTER
----- Message from Nicol Pacheco MD sent at 10/5/2021  7:41 AM CDT -----  Please inform patient that her hormone test for pregnancy is elevated.  I will wait for the ultrasound results that are not available.  These levels should slowly come down until negative/normal.    Her blood count for hemoglobin is normal.    Nicol Pacheco MD

## 2021-10-13 ENCOUNTER — TRANSFERRED RECORDS (OUTPATIENT)
Dept: HEALTH INFORMATION MANAGEMENT | Facility: CLINIC | Age: 36
End: 2021-10-13

## 2021-10-15 ENCOUNTER — OFFICE VISIT (OUTPATIENT)
Dept: FAMILY MEDICINE | Facility: CLINIC | Age: 36
End: 2021-10-15
Payer: COMMERCIAL

## 2021-10-15 VITALS
DIASTOLIC BLOOD PRESSURE: 74 MMHG | HEIGHT: 58 IN | RESPIRATION RATE: 16 BRPM | BODY MASS INDEX: 26.53 KG/M2 | SYSTOLIC BLOOD PRESSURE: 112 MMHG | HEART RATE: 77 BPM | WEIGHT: 126.4 LBS

## 2021-10-15 DIAGNOSIS — O03.9 SPONTANEOUS ABORTION: Primary | ICD-10-CM

## 2021-10-15 DIAGNOSIS — R09.89 SENSATION, CHOKING: ICD-10-CM

## 2021-10-15 DIAGNOSIS — Z30.9 ENCOUNTER FOR CONTRACEPTIVE MANAGEMENT, UNSPECIFIED TYPE: ICD-10-CM

## 2021-10-15 PROBLEM — Z87.59 HISTORY OF MISCARRIAGE: Status: ACTIVE | Noted: 2021-10-15

## 2021-10-15 LAB
HCG SERPL-ACNC: 35 MLU/ML (ref 0–4)
TSH SERPL DL<=0.005 MIU/L-ACNC: 2.05 UIU/ML (ref 0.3–5)

## 2021-10-15 PROCEDURE — 84702 CHORIONIC GONADOTROPIN TEST: CPT | Performed by: FAMILY MEDICINE

## 2021-10-15 PROCEDURE — 84443 ASSAY THYROID STIM HORMONE: CPT | Performed by: FAMILY MEDICINE

## 2021-10-15 PROCEDURE — 99213 OFFICE O/P EST LOW 20 MIN: CPT | Performed by: FAMILY MEDICINE

## 2021-10-15 PROCEDURE — 36415 COLL VENOUS BLD VENIPUNCTURE: CPT | Performed by: FAMILY MEDICINE

## 2021-10-15 ASSESSMENT — MIFFLIN-ST. JEOR: SCORE: 1152.07

## 2021-10-15 NOTE — PROGRESS NOTES
"    Assessment & Plan     ICD-10-CM    1. Spontaneous   O03.9 HCG quantitative pregnancy     TSH with free T4 reflex     HCG quantitative pregnancy     TSH with free T4 reflex   2. Encounter for contraceptive management, unspecified type  Z30.9    3. Sensation, choking  R09.89      Medical history making: Patient is here today for follow-up of her spontaneous miscarriage.  An ultrasound was done and it showed possible some retained products.  However, she has been seen by GYN and since her symptoms of bleeding is stopped, she was asked to follow-up for quantitative hCG.  With the concerns about thyroid, exam normal, will check for TSH.  Long discussion regarding birth control options.  Patient may possibly be interested in IUD.  Printed information is given.    Interview done with the help of a phone   BMI:   Estimated body mass index is 26.19 kg/m  as calculated from the following:    Height as of this encounter: 1.48 m (4' 10.25\").    Weight as of this encounter: 57.3 kg (126 lb 6.4 oz).       See Patient Instructions    Return in about 4 weeks (around 2021) for Routine preventive.    Nicol Pacheco MD  [facility]    Subjective    Chief Complaint   Patient presents with     Follow Up     miscarriage       Ellie is a 35 year old who presents for the following health issues     HPI: Patient is here today for follow-up of her miscarriage.  Since I last saw her, she has seen GYN.  She informs me that her bright red bleeding has now stopped.  She has some clear discharge.  She denies any breast tenderness, abdominal pain, nausea or other concerns.  She does have a concern about feeling of sensation of choking with drinking water suddenly.  This happens once in a while.  She would like me to check her thyroid.  She is also interested in future birth control options.      Review of Systems   Constitutional, HEENT, cardiovascular, pulmonary, gi and gu systems are " "negative, except as otherwise noted.      Objective    /74   Pulse 77   Resp 16   Ht 1.48 m (4' 10.25\")   Wt 57.3 kg (126 lb 6.4 oz)   LMP  (LMP Unknown)   BMI 26.19 kg/m    Body mass index is 26.19 kg/m .  Physical Exam   GENERAL: healthy, alert and no distress  HENT: ear canals and TM's normal, nose and mouth without ulcers or lesions  NECK: no adenopathy, no asymmetry, masses, or scars and thyroid normal to palpation  RESP: lungs clear to auscultation - no rales, rhonchi or wheezes  CV: regular rate and rhythm, normal S1 S2, no S3 or S4, no murmur, click or rub, no peripheral edema and peripheral pulses strong  ABDOMEN: soft, nontender, no hepatosplenomegaly, no masses and bowel sounds normal  MS: no gross musculoskeletal defects noted, no edema    No results found for this or any previous visit (from the past 24 hour(s)).            "

## 2021-10-15 NOTE — LETTER
October 19, 2021      Ellie Cooper  [address] [place] [address]        Dear ,    We are writing to inform you of your test results.    Your hormone levels have come down significantly.  Your thyroid testing is normal.  I will see you next month.     Resulted Orders   HCG quantitative pregnancy   Result Value Ref Range    hCG Quantitative 35 (H) 0 - 4 mlU/mL      Comment:      Non-pregnant female . . . . . . . . . . . . . 0-4 (<5) mIU/mL  Equivocal result for early pregnancy . . . . 5-24 mIU/mL  Values in pregnancy should double every 2-3 days for the first 6 weeks. Patients with very low levels of hCG should be resampled and retested after 48 hours.   For diagnostic purposes, hCG results should be used   conjunction with other data.   If the hCG level is inconsistent with clinical evidence,   results should be confirmed by an alternate hCG method.   This assay is approved for use in the early detection   of pregnancy only. It is not approved for any other   uses such as tumor marker screening or monitoring.   TSH with free T4 reflex   Result Value Ref Range    TSH 2.05 0.30 - 5.00 uIU/mL       If you have any questions or concerns, please call the clinic at the number listed above.       Sincerely,      Nicol Pacheco MD

## 2021-10-15 NOTE — PATIENT INSTRUCTIONS
Patient Education     Birth Control Choices  Birth control keeps you from getting pregnant during sex. There are many types of birth control. Some are more effective than others. New types are being tested all the time. Your healthcare provider can help you decide which type of birth control is best for you. But no matter which type you choose, you and your partner must use it the right way each time you have sex. Some of the most common types are described below.  Condom  A condom is a thin covering that fits over the penis. (The female condom fits inside the vagina.) A condom catches sperm that come out of the penis during sex.  Spermicide  Spermicide is a gel, foam, cream, tablet, or sponge (although the sponge has barrier properties in addition to spermicidal properties). It is put in the vagina before sex to kill sperm.  Diaphragm and cervical cap  Diaphragms and cervical caps are round rubber cups that keep sperm out of the uterus. They also hold spermicide in place.  Intrauterine device (IUD)  An IUD is a small device that is placed in the uterus by a healthcare provider to prevent pregnancy.  The pill  The birth control pill is taken daily. It contains hormones that stop a woman s body from releasing an egg each month.  Other hormones  Hormones that stop a woman s egg from being released each month can be delivered in other ways. These include injection, implant, patch, or vaginal ring.  Other choices  Here are some other birth control methods:    Male sterilization (vasectomy). This is surgery that ties off or cuts the tubes (vas deferens) in the testes. This is done so sperm can't come out when the man ejaculates.    Female sterilization. This is surgery to block or cut the woman's fallopian tubes. It can be done by placing a tool into the uterus (hysteroscopy). This is done to place small coils into the fallopian tubes. The FDA has placed restrictions on this method. If you are interested in this method,  talk with your healthcare provider about possible risks. Female sterilization can also be done through the belly (laparoscopy) to block the tubes. Or to remove part or all of the tubes.    Withdrawal method. This is when the male doesn't ejaculate into the vagina. Instead he withdraws his penis just before he ejaculates. But the failure rate for this method ranges from 22% to 28%.    Fertility awareness method. This is when a woman keeps track of her fertile days. She only has sex at times when she is not likely to get pregnant. This method is hard for women who have irregular periods.  Emergency contraception (EC)  Emergency contraception can help prevent pregnancy after unprotected sex. Hormone pills (morning after pills) are available over the counter to anyone. A second type of EC, a copper IUD, needs to be inserted by a trained healthcare provider. Either type of EC can be used up to 5 days after sex. But it should be taken as soon as possible. The sooner it is used after unprotected sex, the more likely it is to be effective. EC will not work if you re already pregnant.  Things to consider  Think about the following:    Choose a type of birth control that is easy for you to use.    Read the package and follow your healthcare provider's instructions to learn to use your birth control the right way.    Most forms of birth control don't protect you from sexually transmitted infections (STIs). To protect against STIs, always use a latex condom. If you are allergic to latex, a nonlatex condom may offer some protection.  [employer] last reviewed this educational content on 6/1/2019 2000-2021 [employer]. All rights reserved. This information is not intended as a substitute for professional medical care. Always follow your healthcare professional's instructions.         Patient Education     Birth Control: IUD (Intrauterine Device)    The IUD (intrauterine device) is small, flexible, and T-shaped. A  trained healthcare provider places it in the uterus. The IUD is one of the most effective birth control methods. It's also reversible. This means it can be removed at any time by a trained healthcare provider. New IUDs are safe and don't have the risks of older types of IUDs.   Pregnancy rates  Talk to your healthcare provider about the effectiveness of this birth control method.  Types of IUDs  IUD insertion is done in the healthcare provider s office. Two types of IUDs are available:    The copper IUD releases a small amount of copper into the uterus. The copper makes it harder for sperm to reach the egg. The device works for at least 10 years.    The progestin IUD releases a hormone called progestin. It causes changes in the uterus to help prevent pregnancy. The device works for 3 to 5 years, depending on which device is chosen. It may be recommended for women who have anemia or heavy and painful periods.  IUDs have thin strings that hang from the opening of the uterus into the vagina. This lets you check that the IUD stays in place.   Things to know about IUDs    IUDs can be used by women who have never been pregnant or by women with a history of sexually transmitted infections (STIs) or tubal pregnancy.    It won't move from the uterus to any other part of the body.    There is a slight risk of the device coming out of the vagina (expulsion).    It may not work in women who have an abnormally shaped uterus.    A copper IUD may cause heavier periods and cramping.    Progestin IUD may cause light periods or no periods at all (irregular bleeding or spotting is possible and normal during first 3 to 6 months).    If you get a sexually transmitted infection with an IUD in place, symptoms may be more severe.    What to report to your healthcare provider  Be sure your healthcare provider knows if you have:    A sexually transmitted infection (STI) or possible STI    Liver problems    Blood clots (for progestin IUD  only)    Breast cancer or a history of breast cancer (progestin IUD only)  K[employer] last reviewed this educational content on 5/1/2020 2000-2021 [employer]. All rights reserved. This information is not intended as a substitute for professional medical care. Always follow your healthcare professional's instructions.

## 2021-10-18 ENCOUNTER — TRANSFERRED RECORDS (OUTPATIENT)
Dept: HEALTH INFORMATION MANAGEMENT | Facility: CLINIC | Age: 36
End: 2021-10-18

## 2021-11-18 ENCOUNTER — NURSE TRIAGE (OUTPATIENT)
Dept: NURSING | Facility: CLINIC | Age: 36
End: 2021-11-18

## 2021-11-18 ENCOUNTER — OFFICE VISIT (OUTPATIENT)
Dept: FAMILY MEDICINE | Facility: CLINIC | Age: 36
End: 2021-11-18
Payer: COMMERCIAL

## 2021-11-18 VITALS
WEIGHT: 126.8 LBS | HEART RATE: 74 BPM | SYSTOLIC BLOOD PRESSURE: 114 MMHG | HEIGHT: 58 IN | BODY MASS INDEX: 26.62 KG/M2 | OXYGEN SATURATION: 97 % | DIASTOLIC BLOOD PRESSURE: 78 MMHG

## 2021-11-18 DIAGNOSIS — D69.6 THROMBOCYTOPENIA, UNSPECIFIED (H): ICD-10-CM

## 2021-11-18 DIAGNOSIS — Z00.00 ROUTINE GENERAL MEDICAL EXAMINATION AT A HEALTH CARE FACILITY: Primary | ICD-10-CM

## 2021-11-18 DIAGNOSIS — Z30.09 ENCOUNTER FOR COUNSELING REGARDING CONTRACEPTION: ICD-10-CM

## 2021-11-18 DIAGNOSIS — Z12.4 SCREENING FOR CERVICAL CANCER: ICD-10-CM

## 2021-11-18 DIAGNOSIS — Z87.59 HISTORY OF MISCARRIAGE: ICD-10-CM

## 2021-11-18 DIAGNOSIS — N89.8 VAGINAL DISCHARGE: ICD-10-CM

## 2021-11-18 DIAGNOSIS — E78.2 MIXED HYPERLIPIDEMIA: ICD-10-CM

## 2021-11-18 DIAGNOSIS — Z13.220 SCREENING FOR LIPID DISORDERS: ICD-10-CM

## 2021-11-18 LAB
ALBUMIN SERPL-MCNC: 3.9 G/DL (ref 3.5–5)
ALP SERPL-CCNC: 87 U/L (ref 45–120)
ALT SERPL W P-5'-P-CCNC: 12 U/L (ref 0–45)
ANION GAP SERPL CALCULATED.3IONS-SCNC: 10 MMOL/L (ref 5–18)
AST SERPL W P-5'-P-CCNC: 15 U/L (ref 0–40)
BASOPHILS # BLD AUTO: 0.1 10E3/UL (ref 0–0.2)
BASOPHILS NFR BLD AUTO: 1 %
BILIRUB SERPL-MCNC: 1 MG/DL (ref 0–1)
BUN SERPL-MCNC: 9 MG/DL (ref 8–22)
CALCIUM SERPL-MCNC: 9 MG/DL (ref 8.5–10.5)
CHLORIDE BLD-SCNC: 105 MMOL/L (ref 98–107)
CHOLEST SERPL-MCNC: 249 MG/DL
CLUE CELLS: ABNORMAL
CO2 SERPL-SCNC: 25 MMOL/L (ref 22–31)
CREAT SERPL-MCNC: 0.69 MG/DL (ref 0.6–1.1)
EOSINOPHIL # BLD AUTO: 0.1 10E3/UL (ref 0–0.7)
EOSINOPHIL NFR BLD AUTO: 2 %
ERYTHROCYTE [DISTWIDTH] IN BLOOD BY AUTOMATED COUNT: 12.3 % (ref 10–15)
FASTING STATUS PATIENT QL REPORTED: YES
GFR SERPL CREATININE-BSD FRML MDRD: >90 ML/MIN/1.73M2
GLUCOSE BLD-MCNC: 89 MG/DL (ref 70–125)
HCG SERPL-ACNC: 9 MLU/ML (ref 0–4)
HCT VFR BLD AUTO: 46.2 % (ref 35–47)
HDLC SERPL-MCNC: 51 MG/DL
HGB BLD-MCNC: 15.3 G/DL (ref 11.7–15.7)
IMM GRANULOCYTES # BLD: 0 10E3/UL
IMM GRANULOCYTES NFR BLD: 0 %
LDLC SERPL CALC-MCNC: 170 MG/DL
LYMPHOCYTES # BLD AUTO: 2.2 10E3/UL (ref 0.8–5.3)
LYMPHOCYTES NFR BLD AUTO: 33 %
MCH RBC QN AUTO: 29.6 PG (ref 26.5–33)
MCHC RBC AUTO-ENTMCNC: 33.1 G/DL (ref 31.5–36.5)
MCV RBC AUTO: 89 FL (ref 78–100)
MONOCYTES # BLD AUTO: 0.5 10E3/UL (ref 0–1.3)
MONOCYTES NFR BLD AUTO: 8 %
NEUTROPHILS # BLD AUTO: 3.8 10E3/UL (ref 1.6–8.3)
NEUTROPHILS NFR BLD AUTO: 56 %
NRBC # BLD AUTO: 0 10E3/UL
NRBC BLD AUTO-RTO: 0 /100
PLATELET # BLD AUTO: 27 10E3/UL (ref 150–450)
POTASSIUM BLD-SCNC: 4 MMOL/L (ref 3.5–5)
PROT SERPL-MCNC: 7.4 G/DL (ref 6–8)
RBC # BLD AUTO: 5.17 10E6/UL (ref 3.8–5.2)
SODIUM SERPL-SCNC: 140 MMOL/L (ref 136–145)
TRICHOMONAS, WET PREP: ABNORMAL
TRIGL SERPL-MCNC: 138 MG/DL
WBC # BLD AUTO: 6.7 10E3/UL (ref 4–11)
WBC'S/HIGH POWER FIELD, WET PREP: ABNORMAL
YEAST, WET PREP: ABNORMAL

## 2021-11-18 PROCEDURE — 87591 N.GONORRHOEAE DNA AMP PROB: CPT | Performed by: FAMILY MEDICINE

## 2021-11-18 PROCEDURE — 99395 PREV VISIT EST AGE 18-39: CPT | Performed by: FAMILY MEDICINE

## 2021-11-18 PROCEDURE — 85025 COMPLETE CBC W/AUTO DIFF WBC: CPT | Performed by: FAMILY MEDICINE

## 2021-11-18 PROCEDURE — 84702 CHORIONIC GONADOTROPIN TEST: CPT | Performed by: FAMILY MEDICINE

## 2021-11-18 PROCEDURE — 36415 COLL VENOUS BLD VENIPUNCTURE: CPT | Performed by: FAMILY MEDICINE

## 2021-11-18 PROCEDURE — 99213 OFFICE O/P EST LOW 20 MIN: CPT | Mod: 25 | Performed by: FAMILY MEDICINE

## 2021-11-18 PROCEDURE — 80061 LIPID PANEL: CPT | Performed by: FAMILY MEDICINE

## 2021-11-18 PROCEDURE — 87491 CHLMYD TRACH DNA AMP PROBE: CPT | Performed by: FAMILY MEDICINE

## 2021-11-18 PROCEDURE — 87210 SMEAR WET MOUNT SALINE/INK: CPT | Performed by: FAMILY MEDICINE

## 2021-11-18 PROCEDURE — 80053 COMPREHEN METABOLIC PANEL: CPT | Performed by: FAMILY MEDICINE

## 2021-11-18 ASSESSMENT — MIFFLIN-ST. JEOR: SCORE: 1153.88

## 2021-11-18 NOTE — PROGRESS NOTES
ASSESSMENT/PLAN:       ICD-10-CM    1. Routine general medical examination at a health care facility  Z00.00 Comprehensive metabolic panel (BMP + Alb, Alk Phos, ALT, AST, Total. Bili, TP)     CBC with platelets and differential     Comprehensive metabolic panel (BMP + Alb, Alk Phos, ALT, AST, Total. Bili, TP)     CBC with platelets and differential     CANCELED: INFLUENZA VACCINE IM > 6 MONTHS VALENT IIV4 (AFLURIA/FLUZONE)   2. Screening for cervical cancer  Z12.4    3. Screening for lipid disorders  Z13.220 Lipid panel reflex to direct LDL Fasting     Lipid panel reflex to direct LDL Fasting   4. Thrombocytopenia  D69.6 CBC with platelets and differential     CBC with platelets and differential   5. History of miscarriage  Z87.59 HCG quantitative pregnancy     HCG quantitative pregnancy   6. Vaginal discharge  N89.8 Wet prep - Clinic Collect     NEISSERIA GONORRHOEA PCR     CHLAMYDIA TRACHOMATIS PCR     NEISSERIA GONORRHOEA PCR     CHLAMYDIA TRACHOMATIS PCR   7. Encounter for counseling regarding contraception  Z30.09    8. Mixed hyperlipidemia  E78.2      Medical decision making: Patient here today for wellness visit.  Following addressed    1. She has had miscarriage, will check hCG which is coming down. She had a last. November 3. Currently not on birth control.  2. there is a history of thrombocytopenia. Discussed low platelet and miscarriage. If she were to be pregnant, I would like her to follow-up with high risk OB/GYN. This has been noted since many years and at this time platelet count is above 100,000 and stable. Printed education material is given. Discussed the significance of this with increased bruising and bleeding.  3. For birth control, we discussed option long-term with Nexplanon or an IUD. Brochures were provided and discussions done. She will schedule an appointment if she decides to proceed  4. She complained about some vaginal discharge and samples obtained as above. In the past she has had  "yeast infection.  5. Hyperlipidemia: Discussed her previous labs with LDL greater than 190. Discussed medication if numbers remain the same. Patient will like to recheck it and she is fasting today as she believes this was a nonfasting panel.  Patient has been advised of split billing requirements and indicates understanding: Yes  COUNSELING:  Reviewed preventive health counseling, as reflected in patient instructions       Regular exercise       Healthy diet/nutrition       Contraception       Family planning       Advance Care Planning    Estimated body mass index is 26.27 kg/m  as calculated from the following:    Height as of this encounter: 1.48 m (4' 10.25\").    Weight as of this encounter: 57.5 kg (126 lb 12.8 oz).    Weight management plan: Discussed healthy diet and exercise guidelines    She reports that she has never smoked. She has never used smokeless tobacco.         SUBJECTIVE:   CC: Ellie Cooper is an 35 year old woman who presents for preventive health visit.     Chief Complaint   Patient presents with     RECHECK     Follow up from a month ago, miscarriage. Just wants to follow up from blood work and test results- declined shots today       Patient has been advised of split billing requirements and indicates understanding: Yes  HPI      Patient Active Problem List    Diagnosis Date Noted     History of miscarriage 10/15/2021     Priority: Medium     Long term (current) use of non-steroidal anti-inflammatories (nsaid) 10/04/2021     Priority: Medium     Positive pregnancy test 04/05/2017     Priority: Medium     Lumbar disc herniation with radiculopathy 03/07/2017     Priority: Medium     Low back pain 03/25/2015     Priority: Medium     Motion Sickness      Priority: Medium     Created by Conversion         Anemia      Priority: Medium     Created by Conversion         Thrombocytopenia      Priority: Medium     Created by Conversion  [facility] Annotation: Aug  8 2011 11:17AM - Edmund Turner: " gestational,   no   known prior hx of thrombocytopenia             Today's PHQ-2 Score:   PHQ-2 ( 1999 Pfizer) 10/15/2021   Q1: Little interest or pleasure in doing things 0   Q2: Feeling down, depressed or hopeless 0   PHQ-2 Score 0   PHQ-2 Total Score (12-17 Years)- Positive if 3 or more points; Administer PHQ-A if positive 0       Abuse: Current or Past (Physical, Sexual or Emotional) - No  Do you feel safe in your environment? Yes    Have you ever done Advance Care Planning? (For example, a Health Directive, POLST, or a discussion with a medical provider or your loved ones about your wishes): No, advance care planning information given to patient to review.  Advanced care planning was discussed at today's visit.    Social History     Tobacco Use     Smoking status: Never Smoker     Smokeless tobacco: Never Used   Substance Use Topics     Alcohol use: No         No flowsheet data found.    Reviewed orders with patient.  Reviewed health maintenance and updated orders accordingly - Yes  BP Readings from Last 3 Encounters:   11/18/21 114/78   10/15/21 112/74   10/04/21 116/80    Wt Readings from Last 3 Encounters:   11/18/21 57.5 kg (126 lb 12.8 oz)   10/15/21 57.3 kg (126 lb 6.4 oz)   10/04/21 57.9 kg (127 lb 9.6 oz)                    Breast Cancer Screening:  Any new diagnosis of family breast, ovarian, or bowel cancer? No    FHS-7: No flowsheet data found.      Pertinent mammograms are reviewed under the imaging tab.    History of abnormal Pap smear: NO - age 30-65 PAP every 5 years with negative HPV co-testing recommended  PAP / HPV Latest Ref Rng & Units 4/24/2018   PAP - Negative for squamous intraepithelial lesion or malignancy  Electronically signed by Phyllis Tripp CT (ASCP) on 4/30/2018 at 12:20 PM     HPV16 NEG Negative   HPV18 NEG Negative   HRHPV NEG Negative     Reviewed and updated as needed this visit by clinical staff  Tobacco  Allergies  Meds             Reviewed and updated as needed this  "visit by Provider               No past medical history on file.   Past Surgical History:   Procedure Laterality Date     NO PAST SURGERIES         Review of Systems  Constitutional, HEENT, cardiovascular, pulmonary, gi and gu systems are negative, except as otherwise noted.       OBJECTIVE:   /78 (BP Location: Left arm, Patient Position: Sitting, Cuff Size: Adult Regular)   Pulse 74   Ht 1.48 m (4' 10.25\")   Wt 57.5 kg (126 lb 12.8 oz)   SpO2 97%   BMI 26.27 kg/m    Physical Exam  GENERAL: healthy, alert and no distress  EYES: Eyes grossly normal to inspection, PERRL and conjunctivae and sclerae normal  HENT: ear canals and TM's normal, nose and mouth without ulcers or lesions  NECK: no adenopathy, no asymmetry, masses, or scars and thyroid normal to palpation  RESP: lungs clear to auscultation - no rales, rhonchi or wheezes  BREAST: normal without masses, tenderness or nipple discharge and no palpable axillary masses or adenopathy  CV: regular rate and rhythm, normal S1 S2, no S3 or S4, no murmur, click or rub, no peripheral edema and peripheral pulses strong  ABDOMEN: soft, nontender, no hepatosplenomegaly, no masses and bowel sounds normal   (female): normal female external genitalia, normal urethral meatus, vaginal mucosa pink, moist, well rugated, and normal cervix/adnexa/uterus without masses or discharge  MS: no gross musculoskeletal defects noted, no edema  SKIN: no suspicious lesions or rashes  NEURO: Normal strength and tone, mentation intact and speech normal  PSYCH: mentation appears normal, affect normal/bright    Diagnostic Test Results:  Labs reviewed in Epic  Results for orders placed or performed in visit on 11/18/21 (from the past 24 hour(s))   CBC with platelets and differential    Narrative    The following orders were created for panel order CBC with platelets and differential.  Procedure                               Abnormality         Status                     ---------         "                       -----------         ------                     CBC with platelets and d...[[id number]]                      In process                   Please view results for these tests on the individual orders.         Counseling Resources:  ATP IV Guidelines  Pooled Cohorts Equation Calculator  Breast Cancer Risk Calculator  BRCA-Related Cancer Risk Assessment: FHS-7 Tool  FRAX Risk Assessment  ICSI Preventive Guidelines  Dietary Guidelines for Americans, 2010  USDA's MyPlate  ASA Prophylaxis  Lung CA Screening    Nicol Pacehco MD  [facility]

## 2021-11-18 NOTE — PATIENT INSTRUCTIONS
Preventive Health Recommendations  Female Ages 26 - 39  Yearly exam:   See your health care provider every year in order to    Review health changes.     Discuss preventive care.      Review your medicines if you your doctor has prescribed any.    Until age 30: Get a Pap test every three years (more often if you have had an abnormal result).    After age 30: Talk to your doctor about whether you should have a Pap test every 3 years or have a Pap test with HPV screening every 5 years.   You do not need a Pap test if your uterus was removed (hysterectomy) and you have not had cancer.  You should be tested each year for STDs (sexually transmitted diseases), if you're at risk.   Talk to your provider about how often to have your cholesterol checked.  If you are at risk for diabetes, you should have a diabetes test (fasting glucose).  Shots: Get a flu shot each year. Get a tetanus shot every 10 years.   Nutrition:     Eat at least 5 servings of fruits and vegetables each day.    Eat whole-grain bread, whole-wheat pasta and brown rice instead of white grains and rice.    Get adequate Calcium and Vitamin D.     Lifestyle    Exercise at least 150 minutes a week (30 minutes a day, 5 days of the week). This will help you control your weight and prevent disease.    Limit alcohol to one drink per day.    No smoking.     Wear sunscreen to prevent skin cancer.    See your dentist every six months for an exam and cleaning.    Patient Education     Coping with Thrombocytopenia   What is thrombocytopenia?  This is a term for a low platelet count. Platelets are blood cells that help your blood to clot. When your platelets are low, you may bruise or bleed more easily.   Signs of low platelets include:     A lot of bruises    Bleeding gums    Nosebleeds    Tiny purple spots on the feet or legs.  How is it treated?  If your platelets are too low or you have active bleeding, you may need a platelet transfusion.   In this case, we would  give you donated platelets through an IV line (a small tube in your vein).   This usually takes one to two hours.   You will need blood tests to see how well your treatment is working.  What can I do to prevent bleeding?  Until your platelets are back to normal, you need to reduce your risk for bleeding or bruising.    Do not have surgery or dental work.    Do not take medicine unless your care team says it's okay. This includes:  ? Aspirin or products that contain aspirin  ? Aspirin-free pain relievers (such as Advil).  Be sure to read the labels on any store-bought medicines.    Do not drink alcohol unless your care team says it's okay.    Avoid foods that can make your mouth bleed, including popcorn, chips and raw vegetables.    To avoid injuries:  ? Make your home as safe as possible.  ? Take care when using knives and other tools.  ? Be careful not to burn yourself when ironing   or cooking.  ? Wear heavy gloves when working in the garden or near [employer] plants.  ? Wear shoes when you walk.  ? Wear loose-fitting clothes to prevent bruising.    Do low-impact exercise such as walking or swimming. Avoid contact sports.    Keep your lips moist with lip balm. Keep your skin soft with cream or lotion.    Blow your nose gently. Never use your fingers to clean your nose.    Use an electric razor.    Use a soft toothbrush. Do not floss.    Use a water-based gel during sex (intercourse), such as K-Y Jelly or Astroglide. Do not have sex if your platelets are below 50,000.    Do not use enemas, suppositories, douches   or tampons.    Try not to strain when using the toilet. Ask   your care team if you need a stool softener   (such as Colace).  When should I call my care team?  Call your care team if:    You bleed from a cut or wound and it doesn't stop within 30 minutes of applying pressure.    You notice blood in your urine or stool after using the toilet.    You see blood or dark brown spots in your vomit.    You have  nosebleeds, bleeding gums or headaches that you cannot explain.    You see tiny, pinpoint-sized red or purple spots on your skin.    You have heavy bleeding from the vagina (you are soaking one pad an hour) or any change in your periods. This includes heavier bleeding or bleeding between cycles.  Comments:  _______________________________________  _______________________________________  _______________________________________  _______________________________________  _______________________________________  _______________________________________  _______________________________________  _______________________________________  _______________________________________  _______________________________________  For informational purposes only. Not to replace the advice of your health care provider.   Copyright   2006 [employer]. All rights reserved. [employer] 766803 - REV 10/15.         Patient Education     Cholesterol  Does this test have other names?  Total blood cholesterol, serum cholesterol; lipid panel; lipid profile   What is this test?  This test measures the amount of cholesterol in your blood. This helps your healthcare provider figure out your risk for heart disease.   Cholesterol is a waxy fat-like substance found in all of your body's cells. It plays an important role in various body processes. But your body can have too much cholesterol if you eat the wrong types of foods. These include fried foods and foods with saturated or trans fats. Some health conditions can also make your cholesterol level too high.   If you have too much cholesterol in your blood, it can stick to the walls of the arteries in your heart (coronary arteries). The extra cholesterol can make your blood vessels narrower (atherosclerosis). This narrowing makes it harder to get enough blood through your blood vessels. If your heart muscles don't get enough blood, this can cause chest pain and heart attack. Cholesterol can  "also stick to the walls of arteries elsewhere in your body (such as the brain, and legs). This can cause other types of artery diseases.    The American Heart Association advises all adults ages 20 and older have their cholesterol and other traditional risk factors checked every 4 to 6 years.  You may need to have your blood tested more often if you are at risk for heart disease or stroke. Work with your healthcare provider to find out your risk for cardiovascular disease and stroke.   Why do I need this test?  You may have this test as part of your regular health checkup. You may have this test done more often if you are at risk for heart disease or have other health problems caused by high cholesterol.   Here are some common reasons for the test:     You have risk factors for heart disease. These include older age, obesity, family history of heart disease, high blood pressure, smoking, and diabetes.    You have a condition that may be closely linked to high cholesterol. This includes diabetes, alcoholism, and thyroid, liver, or kidney disease.    You eat a diet high in cholesterol and fats.  You may also have this test if you had high or borderline cholesterol on an earlier blood test. Your healthcare provider may want to check to see whether medicine, diet, exercise, and other lifestyle changes are helping lower your cholesterol.   What other tests might I have along with this test?   You may need other blood tests to find out your HDL (\"good\") cholesterol, LDL (\"bad\") cholesterol, and triglyceride levels. This combination of blood tests is called a lipoprotein profile or a lipid profile.   What do my test results mean?  Test results may vary depending on your age, gender, health history, the method used for the test, and other things. Your test results may not mean you have a problem. Ask your healthcare provider what your test results mean for you.     Total cholesterol is measured in milligrams per deciliter " (mg/dL). This is what your number may mean:     Less than 200 mg/dL is desirable . .    200 mg/dL to 239 mg/dL is borderline high.    240 mg/dL or higher means your cholesterol is high.  High cholesterol is only one of the big risk factors for heart disease and stroke. Other things that can increase your risk, include smoking, diabetes, lack of exercise, high blood pressure, unhealthy diet, and age.   To help find your overall risk, your provider may use a risk calculator. It takes into account your cholesterol level and other risk factors. Ask your healthcare provider about your 10-year risk if you are older than 40 or your lifetime risk if you are age 20 to 39. Depending on all of your risk factors, your healthcare provider will talk with you about your cholesterol results and what is important for overall health.   High cholesterol may be linked to these conditions:     Inherited diseases that cause high cholesterol    Gallbladder stones    Kidney failure    Cancer of the pancreas or prostate    Low thyroid hormone    Diabetes    Obesity  Cholesterol levels below 140 mg/dL may happen with:     Very healthy lifestyle and diet    Severe liver disease    High thyroid hormone    Severe burns    White blood cell cancers    Poor nutrition    Some types of anemia    Short-term illness or infection    Chronic lung disease  How is this test done?  The test is done with a blood sample. A needle is used to draw blood from a vein in your arm or hand.    Does this test pose any risks?  Having a blood test with a needle carries some risks. These include bleeding, infection, bruising, and feeling lightheaded. When the needle pricks your arm or hand, you may feel a slight sting or pain. Afterward, the site may be sore.    What might affect my test results?  Your diet, age, alcohol use, and other lifestyle choices may affect your results. Many medicines may also affect your results. Pregnancy may also affect your results. Having  a heart attack in the last 3 months will also affect your results.   How do I get ready for this test?  You may be asked to eat your regular diet and not drink alcohol for at least 2 days before this test. You may be asked not to eat or drink anything but water for a certain amount of time before the test (fast or fasting).   Tell your healthcare provider about all medicines, herbs, vitamins, and supplements you take. This includes medicines that don't need a prescription and any illegal drugs you may use.   [employer] last reviewed this educational content on 9/1/2020 2000-2021 [employer] All rights reserved. This information is not intended as a substitute for professional medical care. Always follow your healthcare professional's instructions.

## 2021-11-19 ENCOUNTER — TELEPHONE (OUTPATIENT)
Dept: FAMILY MEDICINE | Facility: CLINIC | Age: 36
End: 2021-11-19
Payer: COMMERCIAL

## 2021-11-19 ENCOUNTER — PATIENT OUTREACH (OUTPATIENT)
Dept: ONCOLOGY | Facility: CLINIC | Age: 36
End: 2021-11-19
Payer: COMMERCIAL

## 2021-11-19 DIAGNOSIS — D69.6 THROMBOCYTOPENIA, UNSPECIFIED (H): Primary | ICD-10-CM

## 2021-11-19 DIAGNOSIS — Z87.59 HISTORY OF MISCARRIAGE: ICD-10-CM

## 2021-11-19 LAB
C TRACH DNA SPEC QL NAA+PROBE: NEGATIVE
N GONORRHOEA DNA SPEC QL NAA+PROBE: NEGATIVE

## 2021-11-19 NOTE — TELEPHONE ENCOUNTER
Please inform patient that I have got a call from hematologist and her appointment with them will be after December 9.    Meanwhile noting that she has critically low platelets, it is recommended that we check a repeat blood test on her.  She should make a lab only visit on Monday for  blood test that includes CBC, INR, PTT and blood smear.    We may need to check this routinely to make sure that her platelet counts are not decreasing.    Nicol Pacheco MD

## 2021-11-19 NOTE — TELEPHONE ENCOUNTER
Jackelyn from [facility] lab calling with critical low platelet of 27 drawn today at 12:23. Pt has a history of thrombocytopenia. Last platelet count was on 10/4/21 and 102.     Writer paged Dr. Pacheco who advises she will refer pt to hematology. Advise pt to stay hydrated, monitor for signs of abnormal bleeding such as severe nosebleed, bleeding gums, blood in urine or severe bruising. No need for anything else tonight. Call back if symptoms as noted. Writer contacted pt with [facility]  [facility] and relayed message above.     Pt verbalizes understanding and agrees to plan.     Reason for Disposition    Lab or radiology calling with CRITICAL test results    Protocols used: PCP CALL - NO TRIAGE-A-

## 2021-11-19 NOTE — TELEPHONE ENCOUNTER
----- Message from Nicol Pacheco MD sent at 11/19/2021  7:43 AM CST -----  Please inform patient that I have placed a referral for hematology for her low platelets.  I do see that she had been referred to in the past in 2011.  I am not sure if a follow-up plan was done.Her cholesterol remains very high.I would like her to change her dietary habits and cut down on fatty food.  These includes any fried food.  Because of her age, at this time she does not need to be on any medication.  Please ask her to make a follow-up appointment if she desires so I can explain to her in person with help of an .  Nicol Pacheco MD

## 2021-11-19 NOTE — PROGRESS NOTES
Writer received referral for thrombocytopenia. Reviewed for urgency based on labs (platelets 27) and symptomology. Appropriate scheduling instructions added and referral sent to New Patient Scheduling for completion. Additional message to referring provider informing of earliest appointment and request to continue monitoring and possible provider to provider consult for temporary actions while she waits to get in with hematology.

## 2021-11-25 ENCOUNTER — TELEPHONE (OUTPATIENT)
Dept: FAMILY MEDICINE | Facility: CLINIC | Age: 36
End: 2021-11-25
Payer: COMMERCIAL

## 2021-11-25 NOTE — TELEPHONE ENCOUNTER
Please remind patient to schedule a lab only visit so that we can keep a check on her platelet count as she is not due to see the specialist until December 9 and it was recommended that we check her blood counts and other lab test per the hematologist on call.    Nicol Pacheco MD        ----- Message from Nicol Pacheco MD sent at 11/22/2021  8:25 AM CST -----  Check on labs    Nicol Pacheco MD

## 2021-11-29 ENCOUNTER — TELEPHONE (OUTPATIENT)
Dept: FAMILY MEDICINE | Facility: CLINIC | Age: 36
End: 2021-11-29
Payer: COMMERCIAL

## 2021-11-29 NOTE — TELEPHONE ENCOUNTER
Patients son calling to make appt for patient.  She needs to have birth control inserted in her arm.  Please call patient with a [employer] .    Son, Mundo, phone number is [phone]

## 2021-11-29 NOTE — TELEPHONE ENCOUNTER
Spoke to pts son and let him know we do not have anything open until January. He said his mom does not want to wait that long. I let him know he could talk with central scheduling and they could look at other clinics for him if he would like.

## 2021-11-30 NOTE — PROGRESS NOTES
RECORDS STATUS - ALL OTHER DIAGNOSIS      RECORDS RECEIVED FROM: [facility]   DATE RECEIVED: 12/9/2021   NOTES STATUS DETAILS   OFFICE NOTE from referring provider Complete Nicol Mauricio MD  [facility]   OFFICE NOTE from medical oncologist N/A    DISCHARGE SUMMARY from hospital     DISCHARGE REPORT from the ER     OPERATIVE REPORT     MEDICATION LIST     CLINICAL TRIAL TREATMENTS TO DATE     LABS     PATHOLOGY REPORTS     ANYTHING RELATED TO DIAGNOSIS Complete Labs last updated on 11/19/2021   GENONOMIC TESTING     TYPE:     IMAGING (NEED IMAGES & REPORT)     CT SCANS     MRI     MAMMO     ULTRASOUND Complete 10/6/2021 US   PET

## 2021-12-09 ENCOUNTER — TELEPHONE (OUTPATIENT)
Dept: ONCOLOGY | Facility: HOSPITAL | Age: 36
End: 2021-12-09

## 2021-12-09 ENCOUNTER — ONCOLOGY VISIT (OUTPATIENT)
Dept: ONCOLOGY | Facility: HOSPITAL | Age: 36
End: 2021-12-09
Attending: FAMILY MEDICINE
Payer: COMMERCIAL

## 2021-12-09 ENCOUNTER — LAB (OUTPATIENT)
Dept: INFUSION THERAPY | Facility: HOSPITAL | Age: 36
End: 2021-12-09
Attending: FAMILY MEDICINE
Payer: COMMERCIAL

## 2021-12-09 ENCOUNTER — PRE VISIT (OUTPATIENT)
Dept: ONCOLOGY | Facility: HOSPITAL | Age: 36
End: 2021-12-09

## 2021-12-09 VITALS
WEIGHT: 126.6 LBS | BODY MASS INDEX: 26.57 KG/M2 | DIASTOLIC BLOOD PRESSURE: 53 MMHG | HEIGHT: 58 IN | RESPIRATION RATE: 16 BRPM | HEART RATE: 81 BPM | SYSTOLIC BLOOD PRESSURE: 118 MMHG | TEMPERATURE: 98.2 F | OXYGEN SATURATION: 100 %

## 2021-12-09 DIAGNOSIS — Z87.59 HISTORY OF MISCARRIAGE: ICD-10-CM

## 2021-12-09 DIAGNOSIS — D69.6 THROMBOCYTOPENIA, UNSPECIFIED (H): ICD-10-CM

## 2021-12-09 LAB
APTT PPP: 29 SECONDS (ref 22–38)
BASOPHILS # BLD AUTO: 0.1 10E3/UL (ref 0–0.2)
BASOPHILS NFR BLD AUTO: 1 %
EOSINOPHIL # BLD AUTO: 0.2 10E3/UL (ref 0–0.7)
EOSINOPHIL NFR BLD AUTO: 4 %
ERYTHROCYTE [DISTWIDTH] IN BLOOD BY AUTOMATED COUNT: 11.9 % (ref 10–15)
HCT VFR BLD AUTO: 39.8 % (ref 35–47)
HGB BLD-MCNC: 13.2 G/DL (ref 11.7–15.7)
HOLD SPECIMEN: NORMAL
IMM GRANULOCYTES # BLD: 0 10E3/UL
IMM GRANULOCYTES NFR BLD: 0 %
INR PPP: 0.91 (ref 0.9–1.15)
LYMPHOCYTES # BLD AUTO: 2.5 10E3/UL (ref 0.8–5.3)
LYMPHOCYTES NFR BLD AUTO: 41 %
MCH RBC QN AUTO: 29.6 PG (ref 26.5–33)
MCHC RBC AUTO-ENTMCNC: 33.2 G/DL (ref 31.5–36.5)
MCV RBC AUTO: 89 FL (ref 78–100)
MONOCYTES # BLD AUTO: 0.5 10E3/UL (ref 0–1.3)
MONOCYTES NFR BLD AUTO: 9 %
NEUTROPHILS # BLD AUTO: 2.8 10E3/UL (ref 1.6–8.3)
NEUTROPHILS NFR BLD AUTO: 45 %
NRBC # BLD AUTO: 0 10E3/UL
NRBC BLD AUTO-RTO: 0 /100
PLATELET # BLD AUTO: 17 10E3/UL (ref 150–450)
RBC # BLD AUTO: 4.46 10E6/UL (ref 3.8–5.2)
RETICS # AUTO: 0.07 10E6/UL (ref 0.01–0.11)
RETICS/RBC NFR AUTO: 1.5 % (ref 0.8–2.7)
WBC # BLD AUTO: 6 10E3/UL (ref 4–11)

## 2021-12-09 PROCEDURE — 99205 OFFICE O/P NEW HI 60 MIN: CPT | Performed by: INTERNAL MEDICINE

## 2021-12-09 PROCEDURE — G0463 HOSPITAL OUTPT CLINIC VISIT: HCPCS

## 2021-12-09 PROCEDURE — 85610 PROTHROMBIN TIME: CPT | Performed by: INTERNAL MEDICINE

## 2021-12-09 PROCEDURE — 85025 COMPLETE CBC W/AUTO DIFF WBC: CPT | Performed by: INTERNAL MEDICINE

## 2021-12-09 PROCEDURE — 85730 THROMBOPLASTIN TIME PARTIAL: CPT | Performed by: INTERNAL MEDICINE

## 2021-12-09 PROCEDURE — 85045 AUTOMATED RETICULOCYTE COUNT: CPT | Performed by: INTERNAL MEDICINE

## 2021-12-09 PROCEDURE — 36415 COLL VENOUS BLD VENIPUNCTURE: CPT | Performed by: INTERNAL MEDICINE

## 2021-12-09 RX ORDER — DEXAMETHASONE 4 MG/1
40 TABLET ORAL
Qty: 120 TABLET | Refills: 0 | Status: SHIPPED | OUTPATIENT
Start: 2021-12-09

## 2021-12-09 ASSESSMENT — PAIN SCALES - GENERAL: PAINLEVEL: NO PAIN (0)

## 2021-12-09 ASSESSMENT — MIFFLIN-ST. JEOR: SCORE: 1149

## 2021-12-09 NOTE — TELEPHONE ENCOUNTER
Patient called with the aid of an .  Per Dr. Martínez, prescription for dexamethasone sent to her pharmacy.  Patient to start taking it tomorrow in the morning 12/10/2021.  Patient to take 10-4 mg tablets with breakfast for 4 days and then 4 days off then repeat times 3.  Patient to take OTC famotidine 20 mg in the morning while taking dexamethasone. Discussed the side effects of dexamethasone with the patient. Patient states she is going to [place] for a month so will not be able to come in for labs in a week.  Dr. Martínez wants to see her with a lab appointment when she gets back.

## 2021-12-09 NOTE — PROGRESS NOTES
DATE:  12/9/2021   TIME OF RECEIPT FROM LAB:  1421  LAB TEST:  platelet  LAB VALUE:  17  RESULTS GIVEN WITH READ-BACK TO (PROVIDER):     LOCO SUTHERLAND MD  VIDEO,   TIME LAB VALUE REPORTED TO PROVIDER:   1422 - patient was seen in clinic today for thrombocytopenia.    Erna Jimenez RN

## 2021-12-09 NOTE — PROGRESS NOTES
"Oncology Rooming Note    December 9, 2021 1:34 PM   Ellie Cooper is a 35 year old female who presents for:    Chief Complaint   Patient presents with     Oncology Clinic Visit     Initial Vitals: /53   Pulse 81   Temp 98.2  F (36.8  C)   Resp 16   Wt 57.4 kg (126 lb 9.6 oz)   SpO2 100%   BMI 26.23 kg/m   Estimated body mass index is 26.23 kg/m  as calculated from the following:    Height as of 11/18/21: 1.48 m (4' 10.25\").    Weight as of this encounter: 57.4 kg (126 lb 9.6 oz). Body surface area is 1.54 meters squared.  No Pain (0) Comment: Data Unavailable   No LMP recorded. (Menstrual status: UNKNOWN).  Allergies reviewed: Yes  Medications reviewed: Yes    Medications: Medication refills not needed today.  Pharmacy name entered into "[employer]: [id number] - [place], [place] - [address] AT [address]    Clinical concerns: None       Iram Carnes LPN            "

## 2021-12-09 NOTE — LETTER
"    12/9/2021         RE: Ellie Cooper  [address]  [place] [address]        Dear Colleague,    Thank you for referring your patient, Ellie Cooper, to the [facility] [place]. Please see a copy of my visit note below.    Oncology Rooming Note    December 9, 2021 1:34 PM   Ellie Cooper is a 35 year old female who presents for:    Chief Complaint   Patient presents with     Oncology Clinic Visit     Initial Vitals: /53   Pulse 81   Temp 98.2  F (36.8  C)   Resp 16   Wt 57.4 kg (126 lb 9.6 oz)   SpO2 100%   BMI 26.23 kg/m   Estimated body mass index is 26.23 kg/m  as calculated from the following:    Height as of 11/18/21: 1.48 m (4' 10.25\").    Weight as of this encounter: 57.4 kg (126 lb 9.6 oz). Body surface area is 1.54 meters squared.  No Pain (0) Comment: Data Unavailable   No LMP recorded. (Menstrual status: UNKNOWN).  Allergies reviewed: Yes  Medications reviewed: Yes    Medications: Medication refills not needed today.  Pharmacy name entered into [employer] [facility]: [id number] - [place], [place] - [address] [facility] [address] [address]    Clinical concerns: None       Iram Carnes LPN              [facility] Hematology and Oncology Consult Note    Patient: Ellie Cooper  MRN: [id number]  Date of Service: Dec 9, 2021           Reason for consultation      Problem List Items Addressed This Visit        Hematologic    Thrombocytopenia       Other    History of miscarriage          Assessment      1.  A very pleasant 35-year-old woman with what looks like thrombocytopenia.  The differential includes acute leukemia, HIT, drug-induced thrombocytopenia.  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  2.  History of miscarriage.  Pelvis most likely.  Kari miscarriage.  Etiologies are most likely not hematological.  3.  History of some low back pain etiology " unclear.  4.  No evidence of any anemia or leukopenia.  5.  Language barrier.    Normal PTT and INR.  No evidence of any DIC.    Plan      1.  Start on dexamethasone 40 mg p.o. daily for 4 days on and 4 days off x3.  2.  GI prophylaxis with Protonix or Pepcid.  3.  Patient counseled at length regarding the side effects of the treatment including hyperactivity, lack of sleep, increased weight etc.  4.  Follow-up in about a month's time with a platelet count.  5.  Check stool for H. pylori.    Clinical/pathological stage      Cancer Staging  No matching staging information was found for the patient.        History of present illness        Ms. Ellie Cooper is a 35 year old woman who has been referred to me by Dr. Ilya kirkland for evaluation of newly diagnosed thrombocytopenia.    The patient presented to her for follow-up after having a miscarriage.  This was apparently her first trimester miscarriage.  After that she had her blood count checked in her platelet count was down to 102,000.  This was on October 4, 2021.  Repeat CBC done in November 2021 showed a platelet count down to 27,000.  This was normal hemoglobin and white cell count.  No change in the differential as well.    The patient was then referred here.  The patient is feeling fair.  She is complaining that she has slightly increased menstrual blood loss.  Denies any gum bleeding.  Denies any nosebleed.  Denies any headache.  No fever no chills.    No family history of any leukemias lymphomas etc.  Denies any significant alcohol or smoking intake.          Detailed review of systems      No fever or night sweats.  No loss of weight.  No lumps or bumps anywhere.  No unusual headaches or eyesight issues.  No dizziness.  No bleeding from the nose.  No sores in the mouth. No problems with swallowing.  No chest pain. No shortness of breath. No cough.  No abdominal pain. No nausea or vomiting.  No diarrhea or constipation.  No blood in stool or black  "colored stools.  No problems passing urine.  No numbness or tingling in hands or feet.  No skin rashes.  A 14 point review of systems is otherwise negative.      Past medical/surgical/social/family history        No past medical history on file.  Past Surgical History:   Procedure Laterality Date     NO PAST SURGERIES       Family History   Problem Relation Age of Onset     No Known Problems Mother      No Known Problems Father      Diabetes Sister      No Known Problems Brother      No Known Problems Brother      No Known Problems Brother      No Known Problems Brother      Social History     Socioeconomic History     Marital status:      Spouse name: Not on file     Number of children: Not on file     Years of education: Not on file     Highest education level: Not on file   Occupational History     Not on file   Tobacco Use     Smoking status: Never Smoker     Smokeless tobacco: Never Used   Substance and Sexual Activity     Alcohol use: No     Drug use: No     Sexual activity: Yes     Partners: Male   Other Topics Concern     Not on file   Social History Narrative     Not on file     Social Determinants of Health     Financial Resource Strain: Not on file   Food Insecurity: Not on file   Transportation Needs: Not on file   Physical Activity: Not on file   Stress: Not on file   Social Connections: Not on file   Intimate Partner Violence: Not on file   Housing Stability: Not on file           Allergies      No Known Allergies    Physical exam        /53   Pulse 81   Temp 98.2  F (36.8  C)   Resp 16   Ht 1.473 m (4' 10\")   Wt 57.4 kg (126 lb 9.6 oz)   SpO2 100%   BMI 26.46 kg/m        GENERAL: Alert and oriented to time place and person. Seated comfortably. In no distress.    HEAD: Atraumatic and normocephalic.    EYES: MONROE, EOMI. No pallor. No icterus.    Oral cavity: no mucosal lesion or tonsillar enlargement.    NECK: supple. JVP normal.No thyroid enlargement.    LYMPH NODES: No palpable, " cervical, axillary or inguinal lymphadenopathy.    CHEST: clear to auscultation bilaterally. Symmetrical breath movements bilaterally.    CVS: S1 and S2 are Regular rate and rhythm. No murmur or gallop or rub heard. No peripheral edema.    ABDOMEN: Soft. Not tender. Not distended. No palpable hepatomegaly or splenomegaly. No other mass palpable. Bowel sounds heard.    EXTREMITIES: Warm.    SKIN: no rash, or bruising or purpura.      Laboratory data      Recent Results (from the past 240 hour(s))   INR    Collection Time: 12/09/21  1:51 PM   Result Value Ref Range    INR 0.91 0.90 - 1.15   Partial thromboplastin time    Collection Time: 12/09/21  1:51 PM   Result Value Ref Range    aPTT 29 22 - 38 Seconds   CBC with platelets and differential    Collection Time: 12/09/21  1:51 PM   Result Value Ref Range    WBC Count 6.0 4.0 - 11.0 10e3/uL    RBC Count 4.46 3.80 - 5.20 10e6/uL    Hemoglobin 13.2 11.7 - 15.7 g/dL    Hematocrit 39.8 35.0 - 47.0 %    MCV 89 78 - 100 fL    MCH 29.6 26.5 - 33.0 pg    MCHC 33.2 31.5 - 36.5 g/dL    RDW 11.9 10.0 - 15.0 %    Platelet Count 17 (LL) 150 - 450 10e3/uL    % Neutrophils 45 %    % Lymphocytes 41 %    % Monocytes 9 %    % Eosinophils 4 %    % Basophils 1 %    % Immature Granulocytes 0 %    NRBCs per 100 WBC 0 <1 /100    Absolute Neutrophils 2.8 1.6 - 8.3 10e3/uL    Absolute Lymphocytes 2.5 0.8 - 5.3 10e3/uL    Absolute Monocytes 0.5 0.0 - 1.3 10e3/uL    Absolute Eosinophils 0.2 0.0 - 0.7 10e3/uL    Absolute Basophils 0.1 0.0 - 0.2 10e3/uL    Absolute Immature Granulocytes 0.0 <=0.4 10e3/uL    Absolute NRBCs 0.0 10e3/uL   Bld morphology pathology review    Collection Time: 12/09/21  1:51 PM   Result Value Ref Range    Final Diagnosis       PERIPHERAL BLOOD:  -THROMBOCYTOPENIA  -SMALL ATYPICAL LYMPHOCYTES AND MONOCYTES, FAVOR REACTIVE/INFLAMMATORY PROCESS  -PLEASE SEE COMMENT        Comment       Thrombocytopenia is a nonspecific finding and may be due to multiple etiologies that  cause decreased platelet production, increased platelet destruction by immune and by non-immune processes, and abnormal platelet pooling. Please correlate with clinical findings.    The reactive appearing lymphocytes and monocytes may reflect a recent inflammatory or infectious process, possibly resolving.  Recommend clinical correlation and follow-up.        Peripheral Smear       A microscopic examination has been performed to arrive at the diagnosis.      Performing Labs       The technical component of this testing was completed at the [facility] and [facility] laboratories     Reticulocyte count    Collection Time: 12/09/21  1:51 PM   Result Value Ref Range    % Reticulocyte 1.5 0.8 - 2.7 %    Absolute Reticulocyte 0.066 0.010 - 0.110 10e6/uL   Extra Purple Top Tube    Collection Time: 12/09/21  1:51 PM   Result Value Ref Range    Hold Specimen JIC         Imaging results      EXAM: US OB <14 WEEKS WITH TRANSVAGINAL SINGLE  LOCATION: [facility]  DATE/TIME: 10/6/2021 1:42 PM     INDICATION:  Miscarriage  COMPARISON: None.  TECHNIQUE: Transabdominal scans were performed. Endovaginal ultrasound was performed to better visualize the endometrium.     FINDINGS:  UTERUS: 8.8 x 4.7 x 4.7 cm. The endometrium is thickened with internal material. There is slightly increased vascularity of the endometrium.     RIGHT OVARY: Normal.  LEFT OVARY: Normal.                                                                      IMPRESSION:   Slightly increased vascularity of the endometrium could be seen with retained products of conception or endometritis. There may be a small amount of material in the endometrium.      This note has been dictated using voice recognition software. Any grammatical or context distortions are unintentional and inherent to the software    Total time spent on the date of service 61 minutes.    Signed by: Saúl Martínez MD,  MD      DATE:  12/9/2021   TIME OF RECEIPT FROM LAB:  1421  LAB TEST:  platelet  LAB VALUE:  17  RESULTS GIVEN WITH READ-BACK TO (PROVIDER):     SAÚL SUTHERLAND MD  VIDEO,   TIME LAB VALUE REPORTED TO PROVIDER:   1422 - patient was seen in clinic today for thrombocytopenia.    Erna Jimenez RN         Again, thank you for allowing me to participate in the care of your patient.        Sincerely,        Saúl Sutherland MD, MD

## 2021-12-09 NOTE — PROGRESS NOTES
[facility] Hematology and Oncology Consult Note    Patient: Ellie Cooper  MRN: [id number]  Date of Service: Dec 9, 2021           Reason for consultation      Problem List Items Addressed This Visit        Hematologic    Thrombocytopenia       Other    History of miscarriage          Assessment      1.  A very pleasant 35-year-old woman with what looks like thrombocytopenia.  The differential includes acute leukemia, HIT, drug-induced thrombocytopenia.  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  2.  History of miscarriage.  Pelvis most likely.  Kari miscarriage.  Etiologies are most likely not hematological.  3.  History of some low back pain etiology unclear.  4.  No evidence of any anemia or leukopenia.  5.  Language barrier.    Normal PTT and INR.  No evidence of any DIC.    Plan      1.  Start on dexamethasone 40 mg p.o. daily for 4 days on and 4 days off x3.  2.  GI prophylaxis with Protonix or Pepcid.  3.  Patient counseled at length regarding the side effects of the treatment including hyperactivity, lack of sleep, increased weight etc.  4.  Follow-up in about a month's time with a platelet count.  5.  Check stool for H. pylori.    Clinical/pathological stage      Cancer Staging  No matching staging information was found for the patient.        History of present illness        Ms. Ellie Cooper is a 35 year old woman who has been referred to me by Dr. Ilya kirkland for evaluation of newly diagnosed thrombocytopenia.    The patient presented to her for follow-up after having a miscarriage.  This was apparently her first trimester miscarriage.  After that she had her blood count checked in her platelet count was down to 102,000.  This was on October 4, 2021.  Repeat CBC done in November 2021 showed a platelet count down to 27,000.  This was normal hemoglobin and white cell count.  No change in the  differential as well.    The patient was then referred here.  The patient is feeling fair.  She is complaining that she has slightly increased menstrual blood loss.  Denies any gum bleeding.  Denies any nosebleed.  Denies any headache.  No fever no chills.    No family history of any leukemias lymphomas etc.  Denies any significant alcohol or smoking intake.          Detailed review of systems      No fever or night sweats.  No loss of weight.  No lumps or bumps anywhere.  No unusual headaches or eyesight issues.  No dizziness.  No bleeding from the nose.  No sores in the mouth. No problems with swallowing.  No chest pain. No shortness of breath. No cough.  No abdominal pain. No nausea or vomiting.  No diarrhea or constipation.  No blood in stool or black colored stools.  No problems passing urine.  No numbness or tingling in hands or feet.  No skin rashes.  A 14 point review of systems is otherwise negative.      Past medical/surgical/social/family history        No past medical history on file.  Past Surgical History:   Procedure Laterality Date     NO PAST SURGERIES       Family History   Problem Relation Age of Onset     No Known Problems Mother      No Known Problems Father      Diabetes Sister      No Known Problems Brother      No Known Problems Brother      No Known Problems Brother      No Known Problems Brother      Social History     Socioeconomic History     Marital status:      Spouse name: Not on file     Number of children: Not on file     Years of education: Not on file     Highest education level: Not on file   Occupational History     Not on file   Tobacco Use     Smoking status: Never Smoker     Smokeless tobacco: Never Used   Substance and Sexual Activity     Alcohol use: No     Drug use: No     Sexual activity: Yes     Partners: Male   Other Topics Concern     Not on file   Social History Narrative     Not on file     Social Determinants of Health     Financial Resource Strain: Not on  "file   Food Insecurity: Not on file   Transportation Needs: Not on file   Physical Activity: Not on file   Stress: Not on file   Social Connections: Not on file   Intimate Partner Violence: Not on file   Housing Stability: Not on file           Allergies      No Known Allergies    Physical exam        /53   Pulse 81   Temp 98.2  F (36.8  C)   Resp 16   Ht 1.473 m (4' 10\")   Wt 57.4 kg (126 lb 9.6 oz)   SpO2 100%   BMI 26.46 kg/m        GENERAL: Alert and oriented to time place and person. Seated comfortably. In no distress.    HEAD: Atraumatic and normocephalic.    EYES: MONROE, EOMI. No pallor. No icterus.    Oral cavity: no mucosal lesion or tonsillar enlargement.    NECK: supple. JVP normal.No thyroid enlargement.    LYMPH NODES: No palpable, cervical, axillary or inguinal lymphadenopathy.    CHEST: clear to auscultation bilaterally. Symmetrical breath movements bilaterally.    CVS: S1 and S2 are Regular rate and rhythm. No murmur or gallop or rub heard. No peripheral edema.    ABDOMEN: Soft. Not tender. Not distended. No palpable hepatomegaly or splenomegaly. No other mass palpable. Bowel sounds heard.    EXTREMITIES: Warm.    SKIN: no rash, or bruising or purpura.      Laboratory data      Recent Results (from the past 240 hour(s))   INR    Collection Time: 12/09/21  1:51 PM   Result Value Ref Range    INR 0.91 0.90 - 1.15   Partial thromboplastin time    Collection Time: 12/09/21  1:51 PM   Result Value Ref Range    aPTT 29 22 - 38 Seconds   CBC with platelets and differential    Collection Time: 12/09/21  1:51 PM   Result Value Ref Range    WBC Count 6.0 4.0 - 11.0 10e3/uL    RBC Count 4.46 3.80 - 5.20 10e6/uL    Hemoglobin 13.2 11.7 - 15.7 g/dL    Hematocrit 39.8 35.0 - 47.0 %    MCV 89 78 - 100 fL    MCH 29.6 26.5 - 33.0 pg    MCHC 33.2 31.5 - 36.5 g/dL    RDW 11.9 10.0 - 15.0 %    Platelet Count 17 (LL) 150 - 450 10e3/uL    % Neutrophils 45 %    % Lymphocytes 41 %    % Monocytes 9 %    % " Eosinophils 4 %    % Basophils 1 %    % Immature Granulocytes 0 %    NRBCs per 100 WBC 0 <1 /100    Absolute Neutrophils 2.8 1.6 - 8.3 10e3/uL    Absolute Lymphocytes 2.5 0.8 - 5.3 10e3/uL    Absolute Monocytes 0.5 0.0 - 1.3 10e3/uL    Absolute Eosinophils 0.2 0.0 - 0.7 10e3/uL    Absolute Basophils 0.1 0.0 - 0.2 10e3/uL    Absolute Immature Granulocytes 0.0 <=0.4 10e3/uL    Absolute NRBCs 0.0 10e3/uL   Bld morphology pathology review    Collection Time: 12/09/21  1:51 PM   Result Value Ref Range    Final Diagnosis       PERIPHERAL BLOOD:  -THROMBOCYTOPENIA  -SMALL ATYPICAL LYMPHOCYTES AND MONOCYTES, FAVOR REACTIVE/INFLAMMATORY PROCESS  -PLEASE SEE COMMENT        Comment       Thrombocytopenia is a nonspecific finding and may be due to multiple etiologies that cause decreased platelet production, increased platelet destruction by immune and by non-immune processes, and abnormal platelet pooling. Please correlate with clinical findings.    The reactive appearing lymphocytes and monocytes may reflect a recent inflammatory or infectious process, possibly resolving.  Recommend clinical correlation and follow-up.        Peripheral Smear       A microscopic examination has been performed to arrive at the diagnosis.      Performing Labs       The technical component of this testing was completed at the [facility] and [facility] laboratories     Reticulocyte count    Collection Time: 12/09/21  1:51 PM   Result Value Ref Range    % Reticulocyte 1.5 0.8 - 2.7 %    Absolute Reticulocyte 0.066 0.010 - 0.110 10e6/uL   Extra Purple Top Tube    Collection Time: 12/09/21  1:51 PM   Result Value Ref Range    Hold Specimen [facility]         Imaging results      EXAM: US OB <14 WEEKS WITH TRANSVAGINAL SINGLE  LOCATION: [facility]  DATE/TIME: 10/6/2021 1:42 PM     INDICATION:  Miscarriage  COMPARISON: None.  TECHNIQUE: Transabdominal scans were performed. Endovaginal  ultrasound was performed to better visualize the endometrium.     FINDINGS:  UTERUS: 8.8 x 4.7 x 4.7 cm. The endometrium is thickened with internal material. There is slightly increased vascularity of the endometrium.     RIGHT OVARY: Normal.  LEFT OVARY: Normal.                                                                      IMPRESSION:   Slightly increased vascularity of the endometrium could be seen with retained products of conception or endometritis. There may be a small amount of material in the endometrium.      This note has been dictated using voice recognition software. Any grammatical or context distortions are unintentional and inherent to the software    Total time spent on the date of service 61 minutes.    Signed by: Saúl Martínez MD, MD

## 2021-12-10 ENCOUNTER — TELEPHONE (OUTPATIENT)
Dept: ONCOLOGY | Facility: HOSPITAL | Age: 36
End: 2021-12-10
Payer: COMMERCIAL

## 2021-12-10 LAB
PATH REPORT.COMMENTS IMP SPEC: NORMAL
PATH REPORT.COMMENTS IMP SPEC: NORMAL
PATH REPORT.FINAL DX SPEC: NORMAL
PATH REPORT.MICROSCOPIC SPEC OTHER STN: NORMAL

## 2021-12-10 PROCEDURE — 85060 BLOOD SMEAR INTERPRETATION: CPT | Performed by: PATHOLOGY

## 2021-12-10 NOTE — LETTER
Ellie Cooper  [address] [place] [address]          June 28, 2022    Dear Ellie:    Our clinic records indicate we have attempted to contact you to schedule a follow up appointment with Dr. Martínez. Unfortunately, we have been unable to reach you. To prevent further delays in your care, please contact our office at [phone] to schedule your appointment.      Sincerely,     [facility]

## 2021-12-10 NOTE — TELEPHONE ENCOUNTER
Called pt to schedule her 1 mo follow up per Dr Martínez request around 1/9/22. Patient stated she will be out of town for month and she does not want to schedule anything until she comes back.   Asked patient when is she coming back to [place] . Patient did not know.

## 2022-05-12 ENCOUNTER — TELEPHONE (OUTPATIENT)
Dept: ONCOLOGY | Facility: HOSPITAL | Age: 37
End: 2022-05-12

## 2023-12-15 ENCOUNTER — OFFICE VISIT (OUTPATIENT)
Dept: FAMILY MEDICINE | Facility: CLINIC | Age: 38
End: 2023-12-15
Payer: COMMERCIAL

## 2023-12-15 VITALS
DIASTOLIC BLOOD PRESSURE: 86 MMHG | HEART RATE: 90 BPM | OXYGEN SATURATION: 98 % | SYSTOLIC BLOOD PRESSURE: 122 MMHG | TEMPERATURE: 97.9 F

## 2023-12-15 DIAGNOSIS — D69.6 THROMBOCYTOPENIA, UNSPECIFIED (H): ICD-10-CM

## 2023-12-15 DIAGNOSIS — R21 RASH: Primary | ICD-10-CM

## 2023-12-15 LAB
ERYTHROCYTE [DISTWIDTH] IN BLOOD BY AUTOMATED COUNT: 13 % (ref 10–15)
HCT VFR BLD AUTO: 42 % (ref 35–47)
HGB BLD-MCNC: 14.6 G/DL (ref 11.7–15.7)
MCH RBC QN AUTO: 29.4 PG (ref 26.5–33)
MCHC RBC AUTO-ENTMCNC: 34.8 G/DL (ref 31.5–36.5)
MCV RBC AUTO: 85 FL (ref 78–100)
PLATELET # BLD AUTO: 97 10E3/UL (ref 150–450)
RBC # BLD AUTO: 4.97 10E6/UL (ref 3.8–5.2)
WBC # BLD AUTO: 7.3 10E3/UL (ref 4–11)

## 2023-12-15 PROCEDURE — 36415 COLL VENOUS BLD VENIPUNCTURE: CPT | Performed by: FAMILY MEDICINE

## 2023-12-15 PROCEDURE — 99214 OFFICE O/P EST MOD 30 MIN: CPT | Performed by: FAMILY MEDICINE

## 2023-12-15 PROCEDURE — 85027 COMPLETE CBC AUTOMATED: CPT | Performed by: FAMILY MEDICINE

## 2023-12-15 RX ORDER — CETIRIZINE HYDROCHLORIDE 10 MG/1
10 TABLET ORAL DAILY PRN
Qty: 20 TABLET | Refills: 0 | Status: SHIPPED | OUTPATIENT
Start: 2023-12-15

## 2023-12-15 RX ORDER — BETAMETHASONE DIPROPIONATE 0.5 MG/G
CREAM TOPICAL 2 TIMES DAILY
Qty: 15 G | Refills: 0 | Status: SHIPPED | OUTPATIENT
Start: 2023-12-15

## 2023-12-15 NOTE — PROGRESS NOTES
Assessment & Plan     Rash  Doubt ringworm given appearance. Favor a dermatitis such as numular eczema. They do not appear to have secondary infection. Will treat with stronger steroid for 1-2 weeks. If not improved recommended follow up with her doctor or dermatology.   - augmented betamethasone dipropionate (DIPROLENE AF) 0.05 % external cream  Dispense: 15 g; Refill: 0  - cetirizine (ZYRTEC) 10 MG tablet  Dispense: 20 tablet; Refill: 0    Thrombocytopenia  Overdue for recheck. Given the critical value in the past we decided to recheck today. Fortunately was improved. Discussed importance of checking periodically along sooner if signs of easy bleeding occur.   - CBC with platelets  - CBC with platelets       [id number]}    Return in about 1 week (around 12/22/2023) for follow up with your regular clinic if needed.    Adán Jose MD  [facility]    ------------------------------------------------------------------------  Subjective     Ellie Cooper presents to clinic today for the following health issues:  chief complaint  HPI  3 areas of rash on her leg. They all have shown up over the past 5 days and are pruritic. She recalled no exposure nor new medicine or product nor travel. No similar problem in the past. Tried over the counter hydrocortisone without relief.     The patient has a history of thrombocytopenia with last platelets in the teens. She reports no easy bleeding except for some mild occ nosebleeds.       Review of Systems        Objective    /86 (BP Location: Right arm, Patient Position: Sitting)   Pulse 90   Temp 97.9  F (36.6  C) (Tympanic)   SpO2 98%   Physical Exam   GENERAL: healthy, alert and no distress  SKIN: on her left leg there are 3 jose david erythematous papules with some scale. They feel dry. The smallest is about 1-2 cm in diameter and the largest is about 4cm diameter. They are all round, non tender. One on the medial shin and one lateral shin and one at her beltline. No  central clearing.     Results for orders placed or performed in visit on 12/15/23   CBC with platelets     Status: Abnormal   Result Value Ref Range    WBC Count 7.3 4.0 - 11.0 10e3/uL    RBC Count 4.97 3.80 - 5.20 10e6/uL    Hemoglobin 14.6 11.7 - 15.7 g/dL    Hematocrit 42.0 35.0 - 47.0 %    MCV 85 78 - 100 fL    MCH 29.4 26.5 - 33.0 pg    MCHC 34.8 31.5 - 36.5 g/dL    RDW 13.0 10.0 - 15.0 %    Platelet Count 97 (L) 150 - 450 10e3/uL

## 2024-01-12 ENCOUNTER — OFFICE VISIT (OUTPATIENT)
Dept: FAMILY MEDICINE | Facility: CLINIC | Age: 39
End: 2024-01-12
Payer: COMMERCIAL

## 2024-01-12 VITALS
OXYGEN SATURATION: 98 % | DIASTOLIC BLOOD PRESSURE: 82 MMHG | SYSTOLIC BLOOD PRESSURE: 129 MMHG | TEMPERATURE: 98.2 F | HEART RATE: 74 BPM | WEIGHT: 131.2 LBS | RESPIRATION RATE: 20 BRPM | BODY MASS INDEX: 27.42 KG/M2
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
## 2024-01-12 DIAGNOSIS — S50.869A INSECT BITE OF FOREARM, UNSPECIFIED LATERALITY, INITIAL ENCOUNTER: Primary | ICD-10-CM

## 2024-01-12 PROCEDURE — 99213 OFFICE O/P EST LOW 20 MIN: CPT | Performed by: NURSE PRACTITIONER

## 2024-01-12 RX ORDER — HYDROXYZINE HYDROCHLORIDE 25 MG/1
25 TABLET, FILM COATED ORAL 3 TIMES DAILY PRN
Qty: 30 TABLET | Refills: 0 | Status: SHIPPED | OUTPATIENT
Start: 2024-01-12

## 2024-01-12 NOTE — PATIENT INSTRUCTIONS
Call  to check for bedbugs.     Stop current itching pill (cetirizine)    Try hydroxyzine for itching as needed.  May make you sleepy.

## 2024-01-12 NOTE — PROGRESS NOTES
Assessment & Plan     Insect bite of forearm, unspecified laterality, initial encounter    - hydrOXYzine HCl (ATARAX) 25 MG tablet  Dispense: 30 tablet; Refill: 0     Patient with several areas of circumscribed erythematous rash with obvious bite marks in the center of each.  Discussed likely cause of insect bites.  Rash areas have started in been going on for at least 1 month off-and-on, worse in the last couple of days.    Not being helped with previous Zyrtec.    Recommend calling .  Can look at bedding for evidence of tiny drops of blood.   can help to illuminate bugs and advise further.  Did provide a handout.  Patient is a send that speaks English but can help set up an  and review control measures.      Advised the following-  Stop current itching pill (cetirizine)    Try hydroxyzine for itching as needed.  May make you sleepy.               Return in about 2 weeks (around 1/26/2024) for If no better.    Marjorie Vazquez, [facility]     Ellie is a 38 year old female who presents to clinic today for the following health issues:  Chief Complaint   Patient presents with    Derm Problem     Bilateral arms and legs x Wednesday. Pt state had shrimp, possible reaction. Red ring inner Lt arm, very itching, no drainage.     HPI    Itchy areas located on arms and legs starting 3  days ago.      Ate shrimp on Tuesday and rash starting day after.  Has had shrimp before.      Was seen for a rash 1 month ago.  Started zyrtec given last time.  Didn't help.      Due to language barrier, an  was present during the history-taking and subsequent discussion (and for part of the physical exam) with this patient.          Review of Systems    See HPI.          Objective    /82   Pulse 74   Temp 98.2  F (36.8  C) (Tympanic)   Resp 20   Wt 59.5 kg (131 lb 3.2 oz)   LMP 01/03/2024 (Exact Date)   SpO2 98%   BMI 27.42 kg/m     Physical Exam  Constitutional:       Appearance: Normal appearance.   Pulmonary:      Effort: Pulmonary effort is normal.   Skin:     Findings: Rash (Circumscribed erythematous rash areas with bite marks in the center located on extremities.  See photos.  No drainage.) present.   Neurological:      Mental Status: She is alert.   Psychiatric:         Mood and Affect: Mood normal.

## 2025-07-01 ENCOUNTER — RESULTS FOLLOW-UP (OUTPATIENT)
Dept: PEDIATRICS | Facility: CLINIC | Age: 40
End: 2025-07-01

## 2025-07-01 ENCOUNTER — OFFICE VISIT (OUTPATIENT)
Dept: PEDIATRICS | Facility: CLINIC | Age: 40
End: 2025-07-01
Payer: COMMERCIAL

## 2025-07-01 ENCOUNTER — HOSPITAL ENCOUNTER (OUTPATIENT)
Dept: CT IMAGING | Facility: HOSPITAL | Age: 40
Discharge: HOME | End: 2025-07-01
Attending: FAMILY MEDICINE
Payer: COMMERCIAL

## 2025-07-01 ENCOUNTER — OFFICE VISIT (OUTPATIENT)
Dept: URGENT CARE | Facility: URGENT CARE | Age: 40
End: 2025-07-01
Payer: COMMERCIAL

## 2025-07-01 ENCOUNTER — HOSPITAL ENCOUNTER (OUTPATIENT)
Facility: HOSPITAL | Age: 40
Setting detail: OBSERVATION
Discharge: HOME | End: 2025-07-02
Attending: STUDENT IN AN ORGANIZED HEALTH CARE EDUCATION/TRAINING PROGRAM | Admitting: HOSPITALIST
Payer: COMMERCIAL

## 2025-07-01 VITALS
OXYGEN SATURATION: 97 % | TEMPERATURE: 97.4 F | WEIGHT: 130.51 LBS | BODY MASS INDEX: 27.28 KG/M2 | RESPIRATION RATE: 14 BRPM | SYSTOLIC BLOOD PRESSURE: 129 MMHG | HEART RATE: 89 BPM | DIASTOLIC BLOOD PRESSURE: 83 MMHG

## 2025-07-01 VITALS
BODY MASS INDEX: 27.39 KG/M2 | HEART RATE: 89 BPM | RESPIRATION RATE: 14 BRPM | OXYGEN SATURATION: 97 % | HEIGHT: 58 IN | SYSTOLIC BLOOD PRESSURE: 129 MMHG | TEMPERATURE: 97.4 F | WEIGHT: 130.5 LBS | DIASTOLIC BLOOD PRESSURE: 83 MMHG

## 2025-07-01 DIAGNOSIS — D69.6 THROMBOCYTOPENIA, UNSPECIFIED: ICD-10-CM

## 2025-07-01 DIAGNOSIS — K35.200 ACUTE APPENDICITIS WITH GENERALIZED PERITONITIS WITHOUT GANGRENE, PERFORATION, OR ABSCESS: Primary | ICD-10-CM

## 2025-07-01 DIAGNOSIS — K35.80 ACUTE APPENDICITIS, UNSPECIFIED ACUTE APPENDICITIS TYPE: Primary | ICD-10-CM

## 2025-07-01 DIAGNOSIS — R10.31 RLQ ABDOMINAL PAIN: Primary | ICD-10-CM

## 2025-07-01 DIAGNOSIS — R10.31 RLQ ABDOMINAL PAIN: ICD-10-CM

## 2025-07-01 DIAGNOSIS — K35.30 ACUTE APPENDICITIS WITH LOCALIZED PERITONITIS, WITHOUT PERFORATION, ABSCESS, OR GANGRENE: ICD-10-CM

## 2025-07-01 PROBLEM — Z87.59 HISTORY OF MISCARRIAGE: Status: ACTIVE | Noted: 2021-10-15

## 2025-07-01 LAB
ABO + RH BLD: NORMAL
ALBUMIN SERPL-MCNC: 4 G/DL (ref 3.4–5)
ALBUMIN UR-MCNC: NEGATIVE MG/DL
ALP SERPL-CCNC: 76 U/L (ref 40–150)
ALT SERPL W P-5'-P-CCNC: 16 U/L (ref 0–50)
ANION GAP SERPL CALCULATED.3IONS-SCNC: 6 MMOL/L (ref 3–14)
APPEARANCE UR: CLEAR
AST SERPL W P-5'-P-CCNC: 24 U/L (ref 0–45)
BACTERIA #/AREA URNS HPF: ABNORMAL /HPF
BASOPHILS # BLD AUTO: 0 10E3/UL (ref 0–0.2)
BASOPHILS # BLD AUTO: 0 10E3/UL (ref 0–0.2)
BASOPHILS # BLD AUTO: 0.1 10E3/UL (ref 0–0.2)
BASOPHILS NFR BLD AUTO: 0 %
BASOPHILS NFR BLD AUTO: 0 %
BASOPHILS NFR BLD AUTO: 1 %
BILIRUB SERPL-MCNC: 0.8 MG/DL (ref 0.2–1.3)
BILIRUB UR QL STRIP: NEGATIVE
BLD GP AB SCN SERPL QL: NEGATIVE
BLD PROD TYP BPU: NORMAL
BLD PROD TYP BPU: NORMAL
BLOOD COMPONENT TYPE: NORMAL
BLOOD COMPONENT TYPE: NORMAL
BUN SERPL-MCNC: 9 MG/DL (ref 7–30)
CALCIUM SERPL-MCNC: 9.1 MG/DL (ref 8.5–10.1)
CHLORIDE BLD-SCNC: 107 MMOL/L (ref 94–109)
CO2 SERPL-SCNC: 28 MMOL/L (ref 20–32)
CODING SYSTEM: NORMAL
CODING SYSTEM: NORMAL
COLOR UR AUTO: YELLOW
CREAT SERPL-MCNC: 0.5 MG/DL (ref 0.52–1.04)
CRP SERPL-MCNC: 4 MG/L
EGFRCR SERPLBLD CKD-EPI 2021: >90 ML/MIN/1.73M2
EOSINOPHIL # BLD AUTO: 0 10E3/UL (ref 0–0.7)
EOSINOPHIL # BLD AUTO: 0 10E3/UL (ref 0–0.7)
EOSINOPHIL # BLD AUTO: 0.1 10E3/UL (ref 0–0.7)
EOSINOPHIL NFR BLD AUTO: 0 %
EOSINOPHIL NFR BLD AUTO: 0 %
EOSINOPHIL NFR BLD AUTO: 1 %
ERYTHROCYTE [DISTWIDTH] IN BLOOD BY AUTOMATED COUNT: 12.6 % (ref 10–15)
ERYTHROCYTE [DISTWIDTH] IN BLOOD BY AUTOMATED COUNT: 12.6 % (ref 10–15)
ERYTHROCYTE [DISTWIDTH] IN BLOOD BY AUTOMATED COUNT: 12.8 % (ref 10–15)
GLUCOSE BLD-MCNC: 94 MG/DL (ref 70–99)
GLUCOSE UR STRIP-MCNC: NEGATIVE MG/DL
HCG UR QL: NEGATIVE
HCT VFR BLD AUTO: 42.8 % (ref 35–47)
HCT VFR BLD AUTO: 43.1 % (ref 35–47)
HCT VFR BLD AUTO: 46.4 % (ref 35–47)
HGB BLD-MCNC: 13.7 G/DL (ref 11.7–15.7)
HGB BLD-MCNC: 13.7 G/DL (ref 11.7–15.7)
HGB BLD-MCNC: 15 G/DL (ref 11.7–15.7)
HGB UR QL STRIP: ABNORMAL
HOLD SPECIMEN: NORMAL
IMM GRANULOCYTES # BLD: 0.1 10E3/UL
IMM GRANULOCYTES NFR BLD: 0 %
IMM GRANULOCYTES NFR BLD: 1 %
IMM GRANULOCYTES NFR BLD: 1 %
ISSUE DATE AND TIME: NORMAL
ISSUE DATE AND TIME: NORMAL
KETONES UR STRIP-MCNC: NEGATIVE MG/DL
LACTATE SERPL-SCNC: 1 MMOL/L (ref 0.7–2)
LEUKOCYTE ESTERASE UR QL STRIP: NEGATIVE
LIPASE SERPL-CCNC: 17 U/L (ref 13–60)
LYMPHOCYTES # BLD AUTO: 2 10E3/UL (ref 0.8–5.3)
LYMPHOCYTES # BLD AUTO: 2.4 10E3/UL (ref 0.8–5.3)
LYMPHOCYTES # BLD AUTO: 2.5 10E3/UL (ref 0.8–5.3)
LYMPHOCYTES NFR BLD AUTO: 13 %
LYMPHOCYTES NFR BLD AUTO: 15 %
LYMPHOCYTES NFR BLD AUTO: 18 %
MCH RBC QN AUTO: 26.6 PG (ref 26.5–33)
MCH RBC QN AUTO: 26.7 PG (ref 26.5–33)
MCH RBC QN AUTO: 27.4 PG (ref 26.5–33)
MCHC RBC AUTO-ENTMCNC: 31.8 G/DL (ref 31.5–36.5)
MCHC RBC AUTO-ENTMCNC: 32 G/DL (ref 31.5–36.5)
MCHC RBC AUTO-ENTMCNC: 32.3 G/DL (ref 31.5–36.5)
MCV RBC AUTO: 83 FL (ref 78–100)
MCV RBC AUTO: 84 FL (ref 78–100)
MCV RBC AUTO: 85 FL (ref 78–100)
MONOCYTES # BLD AUTO: 0.6 10E3/UL (ref 0–1.3)
MONOCYTES # BLD AUTO: 0.6 10E3/UL (ref 0–1.3)
MONOCYTES # BLD AUTO: 0.9 10E3/UL (ref 0–1.3)
MONOCYTES NFR BLD AUTO: 4 %
MONOCYTES NFR BLD AUTO: 5 %
MONOCYTES NFR BLD AUTO: 6 %
NEUTROPHILS # BLD AUTO: 10.5 10E3/UL (ref 1.6–8.3)
NEUTROPHILS # BLD AUTO: 12.3 10E3/UL (ref 1.6–8.3)
NEUTROPHILS # BLD AUTO: 12.8 10E3/UL (ref 1.6–8.3)
NEUTROPHILS NFR BLD AUTO: 76 %
NEUTROPHILS NFR BLD AUTO: 79 %
NEUTROPHILS NFR BLD AUTO: 81 %
NITRATE UR QL: NEGATIVE
NRBC # BLD AUTO: 0 10E3/UL
NRBC BLD AUTO-RTO: 0 /100
PH UR STRIP: 6 [PH] (ref 5–8)
PLAT MORPH BLD: NORMAL
PLATELET # BLD AUTO: 28 10E3/UL (ref 150–450)
PLATELET # BLD AUTO: 31 10E3/UL (ref 150–450)
PLATELET # BLD AUTO: 37 10E3/UL (ref 150–450)
POTASSIUM BLD-SCNC: 4.7 MMOL/L (ref 3.4–5.3)
PROT SERPL-MCNC: 7.7 G/DL (ref 6.8–8.8)
RBC # BLD AUTO: 5.14 10E6/UL (ref 3.8–5.2)
RBC # BLD AUTO: 5.15 10E6/UL (ref 3.8–5.2)
RBC # BLD AUTO: 5.48 10E6/UL (ref 3.8–5.2)
RBC #/AREA URNS AUTO: ABNORMAL /HPF
RBC MORPH BLD: NORMAL
RETICS # AUTO: 0.05 10E6/UL (ref 0.03–0.1)
RETICS/RBC NFR AUTO: 1.1 % (ref 0.5–2)
SODIUM SERPL-SCNC: 141 MMOL/L (ref 135–145)
SP GR UR STRIP: <=1.005 (ref 1–1.03)
SPECIMEN EXP DATE BLD: NORMAL
UNIT ABO/RH: NORMAL
UNIT ABO/RH: NORMAL
UNIT NUMBER: NORMAL
UNIT NUMBER: NORMAL
UNIT STATUS: NORMAL
UNIT STATUS: NORMAL
UNIT TYPE ISBT: 5100
UNIT TYPE ISBT: 6200
UROBILINOGEN UR STRIP-ACNC: 0.2 E.U./DL
WBC # BLD AUTO: 13.7 10E3/UL (ref 4–11)
WBC # BLD AUTO: 15.2 10E3/UL (ref 4–11)
WBC # BLD AUTO: 16.3 10E3/UL (ref 4–11)
WBC #/AREA URNS AUTO: ABNORMAL /HPF

## 2025-07-01 PROCEDURE — 250N000011 HC RX IP 250 OP 636: Performed by: STUDENT IN AN ORGANIZED HEALTH CARE EDUCATION/TRAINING PROGRAM

## 2025-07-01 PROCEDURE — 36415 COLL VENOUS BLD VENIPUNCTURE: CPT | Performed by: FAMILY MEDICINE

## 2025-07-01 PROCEDURE — 250N000013 HC RX MED GY IP 250 OP 250 PS 637

## 2025-07-01 PROCEDURE — 250N000011 HC RX IP 250 OP 636

## 2025-07-01 PROCEDURE — 258N000003 HC RX IP 258 OP 636: Performed by: STUDENT IN AN ORGANIZED HEALTH CARE EDUCATION/TRAINING PROGRAM

## 2025-07-01 PROCEDURE — 99207 PR APP CREDIT; MD BILLING SHARED VISIT: CPT | Mod: FS

## 2025-07-01 PROCEDURE — 80053 COMPREHEN METABOLIC PANEL: CPT | Performed by: FAMILY MEDICINE

## 2025-07-01 PROCEDURE — 85045 AUTOMATED RETICULOCYTE COUNT: CPT

## 2025-07-01 PROCEDURE — 99207 BLOOD MORPHOLOGY PATHOLOGIST REVIEW: CPT | Performed by: PATHOLOGY

## 2025-07-01 PROCEDURE — 36430 TRANSFUSION BLD/BLD COMPNT: CPT

## 2025-07-01 PROCEDURE — 81025 URINE PREGNANCY TEST: CPT | Performed by: FAMILY MEDICINE

## 2025-07-01 PROCEDURE — 81001 URINALYSIS AUTO W/SCOPE: CPT | Performed by: FAMILY MEDICINE

## 2025-07-01 PROCEDURE — 74177 CT ABD & PELVIS W/CONTRAST: CPT

## 2025-07-01 PROCEDURE — P9035 PLATELET PHERES LEUKOREDUCED: HCPCS

## 2025-07-01 PROCEDURE — 87040 BLOOD CULTURE FOR BACTERIA: CPT

## 2025-07-01 PROCEDURE — 99215 OFFICE O/P EST HI 40 MIN: CPT | Mod: 25 | Performed by: FAMILY MEDICINE

## 2025-07-01 PROCEDURE — 83605 ASSAY OF LACTIC ACID: CPT

## 2025-07-01 PROCEDURE — 86900 BLOOD TYPING SEROLOGIC ABO: CPT | Performed by: STUDENT IN AN ORGANIZED HEALTH CARE EDUCATION/TRAINING PROGRAM

## 2025-07-01 PROCEDURE — 96374 THER/PROPH/DIAG INJ IV PUSH: CPT | Mod: 59

## 2025-07-01 PROCEDURE — 3074F SYST BP LT 130 MM HG: CPT | Performed by: FAMILY MEDICINE

## 2025-07-01 PROCEDURE — 36415 COLL VENOUS BLD VENIPUNCTURE: CPT | Performed by: STUDENT IN AN ORGANIZED HEALTH CARE EDUCATION/TRAINING PROGRAM

## 2025-07-01 PROCEDURE — 96375 TX/PRO/DX INJ NEW DRUG ADDON: CPT

## 2025-07-01 PROCEDURE — 3079F DIAST BP 80-89 MM HG: CPT

## 2025-07-01 PROCEDURE — 96374 THER/PROPH/DIAG INJ IV PUSH: CPT | Performed by: FAMILY MEDICINE

## 2025-07-01 PROCEDURE — 83690 ASSAY OF LIPASE: CPT | Performed by: FAMILY MEDICINE

## 2025-07-01 PROCEDURE — T1013 SIGN LANG/ORAL INTERPRETER: HCPCS | Mod: U4 | Performed by: INTERPRETER

## 2025-07-01 PROCEDURE — 85025 COMPLETE CBC W/AUTO DIFF WBC: CPT | Performed by: FAMILY MEDICINE

## 2025-07-01 PROCEDURE — 86140 C-REACTIVE PROTEIN: CPT | Performed by: FAMILY MEDICINE

## 2025-07-01 PROCEDURE — 99223 1ST HOSP IP/OBS HIGH 75: CPT | Mod: FS | Performed by: STUDENT IN AN ORGANIZED HEALTH CARE EDUCATION/TRAINING PROGRAM

## 2025-07-01 PROCEDURE — 36415 COLL VENOUS BLD VENIPUNCTURE: CPT

## 2025-07-01 PROCEDURE — 96376 TX/PRO/DX INJ SAME DRUG ADON: CPT

## 2025-07-01 PROCEDURE — 85004 AUTOMATED DIFF WBC COUNT: CPT

## 2025-07-01 PROCEDURE — 3074F SYST BP LT 130 MM HG: CPT

## 2025-07-01 PROCEDURE — 250N000009 HC RX 250: Performed by: FAMILY MEDICINE

## 2025-07-01 PROCEDURE — 99418 PROLNG IP/OBS E/M EA 15 MIN: CPT | Mod: FS | Performed by: STUDENT IN AN ORGANIZED HEALTH CARE EDUCATION/TRAINING PROGRAM

## 2025-07-01 PROCEDURE — 120N000001 HC R&B MED SURG/OB

## 2025-07-01 PROCEDURE — 250N000011 HC RX IP 250 OP 636: Performed by: FAMILY MEDICINE

## 2025-07-01 PROCEDURE — 3079F DIAST BP 80-89 MM HG: CPT | Performed by: FAMILY MEDICINE

## 2025-07-01 PROCEDURE — 99207 REFERRAL TO ACUTE AND DIAGNOSTIC SERVICES: CPT

## 2025-07-01 RX ORDER — NALOXONE HYDROCHLORIDE 0.4 MG/ML
0.2 INJECTION, SOLUTION INTRAMUSCULAR; INTRAVENOUS; SUBCUTANEOUS
Status: DISCONTINUED | OUTPATIENT
Start: 2025-07-01 | End: 2025-07-02 | Stop reason: HOSPADM

## 2025-07-01 RX ORDER — SODIUM CHLORIDE 9 MG/ML
INJECTION, SOLUTION INTRAVENOUS CONTINUOUS
Status: DISCONTINUED | OUTPATIENT
Start: 2025-07-01 | End: 2025-07-02 | Stop reason: HOSPADM

## 2025-07-01 RX ORDER — NALOXONE HYDROCHLORIDE 0.4 MG/ML
0.4 INJECTION, SOLUTION INTRAMUSCULAR; INTRAVENOUS; SUBCUTANEOUS
Status: DISCONTINUED | OUTPATIENT
Start: 2025-07-01 | End: 2025-07-02 | Stop reason: HOSPADM

## 2025-07-01 RX ORDER — AMOXICILLIN 250 MG
2 CAPSULE ORAL 2 TIMES DAILY PRN
Status: DISCONTINUED | OUTPATIENT
Start: 2025-07-01 | End: 2025-07-02 | Stop reason: HOSPADM

## 2025-07-01 RX ORDER — LIDOCAINE 40 MG/G
CREAM TOPICAL
Status: DISCONTINUED | OUTPATIENT
Start: 2025-07-01 | End: 2025-07-02 | Stop reason: HOSPADM

## 2025-07-01 RX ORDER — PROCHLORPERAZINE MALEATE 10 MG
10 TABLET ORAL EVERY 6 HOURS PRN
Status: DISCONTINUED | OUTPATIENT
Start: 2025-07-01 | End: 2025-07-02 | Stop reason: HOSPADM

## 2025-07-01 RX ORDER — POLYETHYLENE GLYCOL 3350 17 G/17G
17 POWDER, FOR SOLUTION ORAL 2 TIMES DAILY PRN
Status: DISCONTINUED | OUTPATIENT
Start: 2025-07-01 | End: 2025-07-02 | Stop reason: HOSPADM

## 2025-07-01 RX ORDER — HYDROMORPHONE HCL IN WATER/PF 6 MG/30 ML
0.2 PATIENT CONTROLLED ANALGESIA SYRINGE INTRAVENOUS
Status: DISCONTINUED | OUTPATIENT
Start: 2025-07-01 | End: 2025-07-02 | Stop reason: HOSPADM

## 2025-07-01 RX ORDER — KETOROLAC TROMETHAMINE 30 MG/ML
30 INJECTION, SOLUTION INTRAMUSCULAR; INTRAVENOUS ONCE
Status: DISCONTINUED | OUTPATIENT
Start: 2025-07-01 | End: 2025-07-01

## 2025-07-01 RX ORDER — CALCIUM CARBONATE 500 MG/1
1000 TABLET, CHEWABLE ORAL 4 TIMES DAILY PRN
Status: DISCONTINUED | OUTPATIENT
Start: 2025-07-01 | End: 2025-07-02 | Stop reason: HOSPADM

## 2025-07-01 RX ORDER — HYDROMORPHONE HYDROCHLORIDE 2 MG/1
2 TABLET ORAL EVERY 4 HOURS PRN
Refills: 0 | Status: DISCONTINUED | OUTPATIENT
Start: 2025-07-01 | End: 2025-07-01

## 2025-07-01 RX ORDER — ACETAMINOPHEN 650 MG/1
650 SUPPOSITORY RECTAL EVERY 4 HOURS PRN
Status: DISCONTINUED | OUTPATIENT
Start: 2025-07-01 | End: 2025-07-02

## 2025-07-01 RX ORDER — ACETAMINOPHEN 325 MG/1
650 TABLET ORAL EVERY 4 HOURS PRN
Status: DISCONTINUED | OUTPATIENT
Start: 2025-07-01 | End: 2025-07-02

## 2025-07-01 RX ORDER — PIPERACILLIN SODIUM, TAZOBACTAM SODIUM 3; .375 G/15ML; G/15ML
3.38 INJECTION, POWDER, LYOPHILIZED, FOR SOLUTION INTRAVENOUS EVERY 6 HOURS
Status: DISCONTINUED | OUTPATIENT
Start: 2025-07-01 | End: 2025-07-02

## 2025-07-01 RX ORDER — OXYCODONE HYDROCHLORIDE 5 MG/1
5 TABLET ORAL EVERY 4 HOURS PRN
Refills: 0 | Status: DISCONTINUED | OUTPATIENT
Start: 2025-07-01 | End: 2025-07-02

## 2025-07-01 RX ORDER — ONDANSETRON 4 MG/1
4 TABLET, ORALLY DISINTEGRATING ORAL EVERY 6 HOURS PRN
Status: DISCONTINUED | OUTPATIENT
Start: 2025-07-01 | End: 2025-07-02 | Stop reason: HOSPADM

## 2025-07-01 RX ORDER — METHYLPREDNISOLONE SODIUM SUCCINATE 40 MG/ML
40 INJECTION INTRAMUSCULAR; INTRAVENOUS ONCE
Status: DISCONTINUED | OUTPATIENT
Start: 2025-07-01 | End: 2025-07-01

## 2025-07-01 RX ORDER — AMOXICILLIN 250 MG
1 CAPSULE ORAL 2 TIMES DAILY PRN
Status: DISCONTINUED | OUTPATIENT
Start: 2025-07-01 | End: 2025-07-02 | Stop reason: HOSPADM

## 2025-07-01 RX ORDER — ONDANSETRON 2 MG/ML
4 INJECTION INTRAMUSCULAR; INTRAVENOUS EVERY 6 HOURS PRN
Status: DISCONTINUED | OUTPATIENT
Start: 2025-07-01 | End: 2025-07-02 | Stop reason: HOSPADM

## 2025-07-01 RX ORDER — IOPAMIDOL 755 MG/ML
64 INJECTION, SOLUTION INTRAVASCULAR ONCE
Status: COMPLETED | OUTPATIENT
Start: 2025-07-01 | End: 2025-07-01

## 2025-07-01 RX ADMIN — SODIUM CHLORIDE: 0.9 INJECTION, SOLUTION INTRAVENOUS at 20:58

## 2025-07-01 RX ADMIN — DEXAMETHASONE SODIUM PHOSPHATE 40 MG: 10 INJECTION, SOLUTION INTRAMUSCULAR; INTRAVENOUS at 21:26

## 2025-07-01 RX ADMIN — PIPERACILLIN AND TAZOBACTAM 3.38 G: 3; .375 INJECTION, POWDER, FOR SOLUTION INTRAVENOUS at 23:56

## 2025-07-01 RX ADMIN — SODIUM CHLORIDE 66 ML: 9 INJECTION, SOLUTION INTRAVENOUS at 15:43

## 2025-07-01 RX ADMIN — PIPERACILLIN AND TAZOBACTAM 3.38 G: 3; .375 INJECTION, POWDER, FOR SOLUTION INTRAVENOUS at 18:29

## 2025-07-01 RX ADMIN — ACETAMINOPHEN 650 MG: 325 TABLET ORAL at 18:29

## 2025-07-01 RX ADMIN — HYDROMORPHONE HYDROCHLORIDE 0.2 MG: 0.2 INJECTION, SOLUTION INTRAMUSCULAR; INTRAVENOUS; SUBCUTANEOUS at 21:36

## 2025-07-01 RX ADMIN — KETOROLAC TROMETHAMINE 30 MG: 30 INJECTION, SOLUTION INTRAMUSCULAR; INTRAVENOUS at 15:03

## 2025-07-01 RX ADMIN — IOPAMIDOL 64 ML: 755 INJECTION, SOLUTION INTRAVENOUS at 15:43

## 2025-07-01 ASSESSMENT — ENCOUNTER SYMPTOMS
PALPITATIONS: 0
WEAKNESS: 0
WHEEZING: 0
SEIZURES: 0
TINGLING: 0
DEPRESSION: 0
BLOOD IN STOOL: 0
ORTHOPNEA: 0
FREQUENCY: 0
FEVER: 0
FLANK PAIN: 0
BLURRED VISION: 0
WEIGHT LOSS: 0
SHORTNESS OF BREATH: 0
COUGH: 0
VOMITING: 0
NAUSEA: 0
HEARTBURN: 0
DIAPHORESIS: 0
CHILLS: 0
DIZZINESS: 0
ABDOMINAL PAIN: 1

## 2025-07-01 ASSESSMENT — ACTIVITIES OF DAILY LIVING (ADL)
ADLS_ACUITY_SCORE: 41
ADLS_ACUITY_SCORE: 15
ADLS_ACUITY_SCORE: 15
ADLS_ACUITY_SCORE: 41

## 2025-07-01 NOTE — PROGRESS NOTES
Patient presents with:  Abdominal Pain: RLQ abdominal tight pain yesterday. Normal BM.      Clinical Decision Making:      ICD-10-CM    1. RLQ abdominal pain  R10.31 Referral to Acute and Diagnostic Services (Day of diagnostic / First order acute)        Patient with right lower quadrant pain and tenderness that started yesterday and radiates her to her back.  The primary concern would be acute appendicitis, discussed this with [facility] provider who agrees to see patient.  Patient will be transferred to [facility] services for further evaluation.    Referral to Acute and Diagnostic Services    [phone] ([address], [place]  [address]    Transition to Acute & Diagnostic Services Clinic has been discussed with patient, and she agrees with next level of care.   Patient understands that evaluation/treatment at [facility] typically takes significantly longer than in clinic/urgent care (>2 hours).  [facility] Acute [facility]ostics [facility] has been contacted by provider/staff to confirm patient acceptance.         Special issues:      None                     The following provider has assessed this patient for intervention at [facility], and directed the patient for referral: CAMILA Lai CNP                  HPI:  Ellie Cooper is a 39 year old female who presents today with concerns of right lower abdominal pain. Symptoms started yesterday. Pain radiates to the back.  No vomiting or fevers. No diarrhea or constipation. No nausea. No dietary changes. No concern for pregnancy. No urinary changes.     History obtained from the patient.    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    Social History     Tobacco Use    Smoking status: Never     Passive exposure: Current (son smokes outside)    Smokeless tobacco: Never   Substance Use Topics    Alcohol use: No       ROS is negative other than what is noted in HPI.      Vitals:    07/01/25 1238   BP: 129/83   Pulse: 89   Resp: 14   Temp: 97.4  F (36.3  C)   TempSrc: Tympanic   SpO2: 97%   Weight: 59.2 kg (130 lb 8 oz)   Height: 1.473 m (4' 10\")       Physical Exam  Constitutional:       General: She is not in acute distress.     Appearance: She is not diaphoretic.   HENT:      Head: Normocephalic and atraumatic.      Right Ear: External ear normal.      Left Ear: External ear normal.   Eyes:      Conjunctiva/sclera: Conjunctivae normal.   Cardiovascular:      Rate and Rhythm: Normal rate and regular rhythm.   Pulmonary:      Effort: Pulmonary effort is normal. No respiratory distress.   Abdominal:      Tenderness: There is abdominal tenderness in the right lower quadrant. There is no right CVA tenderness or left CVA tenderness.   Neurological:      Mental Status: She is alert.   Psychiatric:         Mood and Affect: Mood normal.         Thought Content: Thought content normal.         Judgment: Judgment normal.         Results:  No results found for any visits on 07/01/25.      At the end of the encounter, I discussed results, diagnosis, medications. Discussed red flags for immediate return to clinic/ER, as well as indications for follow up if no improvement. Patient understood and agreed to plan. Patient was stable for discharge.    CAMILA Lai [facility]   "

## 2025-07-01 NOTE — PROGRESS NOTES
"Acute and Diagnostic Services Clinic Visit  Patient seen with the help of a telephone  (Cameron)    Assessment & Plan     Acute appendicitis with generalized peritonitis without gangrene, perforation, or abscess  Discussed with surgeon: Dr Stubbs  Discussed with hospitalist at [facility] who accepted patient for inpatient care to treat thrombocytopenia and will consult general surgeon Dr. Stubbs for acute appendicitis.  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  This likely need to be rechecked in the hospital.  Patient was given a dose of Toradol here in clinic for pain before the thrombocytopenia was recognized.    RLQ abdominal pain  Reviewed all labs and imaging results with patient and her son in clinic with the telephone .  Some improvement in the pain with toradol.  It is now tolerable.  - sodium chloride (PF) 0.9% PF flush 3 mL  - ketorolac (TORADOL) injection 30 mg  - CBC with platelets differential; Future  - Comprehensive metabolic panel; Future  - CRP inflammation; Future  - HCG qualitative urine; Future  - Lipase; Future  - UA with Microscopic reflex to Culture; Future  - CT Abdomen Pelvis w Contrast; Future  - Comprehensive metabolic panel  - CBC with platelets differential  - UA with Microscopic reflex to Culture  - Lipase  - HCG qualitative urine  - CRP inflammation  - UA Microscopic with Reflex to Culture    Over 40 minutes were spent doing chart review, history and exam, documentation and further activities per the note.       BMI  Estimated body mass index is 27.28 kg/m  as calculated from the following:    Height as of an earlier encounter on 7/1/25: 1.473 m (4' 10\").    Weight as of this encounter: 59.2 kg (130 lb 8.2 oz).   Weight " management plan: Not addressed at today's visit          Subjective   Ellie is a 39 year old, presenting for the following health issues:  Abdominal Pain (RLQ)    HPI      Abdominal/Flank Pain  Onset/Duration: yesterday evening  Description:   Character: pt is unsure of character  Location: right lower quadrant  Radiation: Back  Intensity: 9/10  Progression of Symptoms:  worsening  Accompanying Signs & Symptoms:  Fever/chills: no   Gas/Bloating: no   Nausea: no   Vomitting: no   Diarrhea: no   Constipation:no   Dysuria: no            Hematuria: no            Frequency: no - depends on how much the pt consume           Incontinence of urine: no   History:            Last bowel movement: today - 9am  Trauma: no   Previous similar pain: YES- 2nd time, but this current one is different from the first time (previous pain was sharp at the tailbone, affected the urinating).   Previous tests done: pt has but unsure of what tests           Previous Abdominal surgery: no   Precipitating factors:   Does the pain change with:     Food: no  - pt still experience pain    Bowel Movement: no     Urination: no              Other factors: no   Therapies tried and outcome:  None    When food last eaten: this morning at 7am noodles.  Last had water at 11am.    Patient is a 39-year-old female who complains of right lower quadrant abdominal pain which radiates up to the right upper quadrant and right flank since Sunday night.  Today is Tuesday.  She denies nausea and vomiting.  No diarrhea.  Denies dysuria and hematuria.  No fevers or chills.  No previous abdominal surgeries.  Pain is severe    Review of Systems  Negative except as listed in HPI      Objective    /83   Pulse 89   Temp 97.4  F (36.3  C) (Oral)   Resp 14   Wt 59.2 kg (130 lb 8.2 oz)   LMP 05/25/2025 (Exact Date)   SpO2 97%   BMI 27.28 kg/m    Body mass index is 27.28 kg/m .  Physical Exam   Vitals are noted and within normal limits  In general she is alert,  oriented, in no acute distress  Heart regular rate and rhythm without murmur  Lungs clear to auscultation bilaterally with good air entry  Back with no CVA tenderness  Abdomen with normal bowel sounds.  Soft and nondistended.  Tenderness and guarding in the right lower quadrant  CBC: Prelim has elevated white count of 16, elevated absolute neutrophils, and critically low platelets at 42.  This will be sent out for verification.  Hemoglobin is normal.  CMP: Within normal limits  Lipase: Normal  UA: Trace blood on microscopic.  Microscopic:no sign of infection  UPT: Negative  CRP: Normal  CT abdomen/pelvis: Acute appendicitis  Results for orders placed or performed during the hospital encounter of 07/01/25   CT Abdomen Pelvis w Contrast     Status: None    Narrative    EXAM: CT ABDOMEN PELVIS W CONTRAST  LOCATION: [facility]  DATE: 7/1/2025    INDICATION:  RLQ abdominal pain  COMPARISON: None.  TECHNIQUE: CT scan of the abdomen and pelvis was performed following injection of IV contrast. Multiplanar reformats were obtained. Dose reduction techniques were used.  CONTRAST: Isovue 370, 64 mL    FINDINGS:   LOWER CHEST: Normal.    HEPATOBILIARY: Normal.    PANCREAS: Normal.    SPLEEN: Normal.    ADRENAL GLANDS: Normal.    KIDNEYS/BLADDER: Normal.    BOWEL: The appendix is dilated measuring up to 11 mm in diameter and has mucosal hyperenhancement particularly towards the base of the cecum. There is periappendiceal inflammatory stranding but no right lower quadrant fluid collection or free air.   Stomach and small bowel is normal. Colonic stool burden is normal.    LYMPH NODES: Normal.    VASCULATURE: Normal.    PELVIC ORGANS: Retroflexed uterus. Small amount of free fluid in the pelvis. Physiologic follicles in the ovaries which are not enlarged.    MUSCULOSKELETAL: Normal.      Impression    IMPRESSION:     Acute, uncomplicated appendicitis.   Results for orders placed or performed in visit on  07/01/25   Comprehensive metabolic panel     Status: Abnormal   Result Value Ref Range    Sodium 141 135 - 145 mmol/L    Potassium 4.7 3.4 - 5.3 mmol/L    Chloride 107 94 - 109 mmol/L    Carbon Dioxide (CO2) 28 20 - 32 mmol/L    Anion Gap 6 3 - 14 mmol/L    Urea Nitrogen 9 7 - 30 mg/dL    Creatinine 0.50 (L) 0.52 - 1.04 mg/dL    GFR Estimate >90 >60 mL/min/1.73m2    Calcium 9.1 8.5 - 10.1 mg/dL    Glucose 94 70 - 99 mg/dL    Alkaline Phosphatase 76 40 - 150 U/L    AST 24 0 - 45 U/L    ALT 16 0 - 50 U/L    Protein Total 7.7 6.8 - 8.8 g/dL    Albumin 4.0 3.4 - 5.0 g/dL    Bilirubin Total 0.8 0.2 - 1.3 mg/dL   UA with Microscopic reflex to Culture     Status: Abnormal    Specimen: Urine, NOS   Result Value Ref Range    Color Urine Yellow Colorless, Straw, Light Yellow, Yellow    Appearance Urine Clear Clear    Glucose Urine Negative Negative mg/dL    Bilirubin Urine Negative Negative    Ketones Urine Negative Negative mg/dL    Specific Gravity Urine <=1.005 1.005 - 1.030    Blood Urine Trace (A) Negative    pH Urine 6.0 5.0 - 8.0    Protein Albumin Urine Negative Negative mg/dL    Urobilinogen Urine 0.2 0.2, 1.0 E.U./dL    Nitrite Urine Negative Negative    Leukocyte Esterase Urine Negative Negative   Lipase     Status: Normal   Result Value Ref Range    Lipase 17 13 - 60 U/L   HCG qualitative urine     Status: Normal   Result Value Ref Range    hCG Urine Qualitative Negative Negative   CRP inflammation     Status: Normal   Result Value Ref Range    CRP Inflammation 4.00 <5.00 mg/L   UA Microscopic with Reflex to Culture     Status: Abnormal   Result Value Ref Range    Bacteria Urine Few (A) None Seen /HPF    RBC Urine 0-2 0-2 /HPF /HPF    WBC Urine None Seen 0-5 /HPF /HPF    Narrative    Urine Culture not indicated   CBC with platelets differential     Status: None (In process)    Narrative    The following orders were created for panel order CBC with platelets differential.  Procedure                                Abnormality         Status                     ---------                               -----------         ------                     CBC with platelets and ...[[id number]]                      In process                   Please view results for these tests on the individual orders.                 Signed Electronically by: Gin Donnelly MD

## 2025-07-01 NOTE — H&P
[facility]    History and Physical - Hospitalist Service       Date of Admission:  7/1/2025    Assessment & Plan      Ellie Cooper is a 39 year old female admitted on 7/1/2025. She has a PMH of thrombocytopenia, miscarriage initially presented to urgent care for evaluation of right lower quadrant abdominal pain.  CT abdomen pelvis W/contrast showed acute uncomplicated appendicitis.  Presents as a direct admission with general surgery and hematology/oncology consulted.  IV Zosyn was initiated and transfusion consent form as well as 2 units of platelets were ordered as platelets on admission were 31.  Pending a.m. platelet count, potential plans for surgical intervention tomorrow.  N.p.o. at midnight    Acute uncomplicated appendicitis  Leukocytosis  - CT abdomen pelvis w/ contrast: Acute, uncomplicated appendicitis   - hCG was negative  - CBC with WBCs 15.2    Plan:   - AM CBC, check lactic acid  -Blood cultures ordered on admission  -Supportive care with as needed antipyretics, antiemetics and analgesics  - IV Zosyn initiated  - N.p.o. 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  Has not followed up with hematology/oncology since.  She denies any active source of bleed  - Platelets on admission 31  - blood transfusion consent form completed at the time of admission  - 2 units of platelets ordered, 40 mg IV dexamethasone x 4 days  - Avoid all anticoagulation  - peripheral smear ordered   - Appreciate hematology/oncology consultation for further assistance and recommendations          Diet: NPO for Procedure/Surgery per Anesthesia Guidelines Except for: Meds; Clear liquids before procedure/surgery: ADULT (Age GREATER than or Equal to 18 years) - Clear liquids 2 hours before  "procedure/surgery  Combination Diet Clear Liquid  DVT Prophylaxis: Pneumatic Compression Devices  Gonzalez Catheter: Not present  Lines: None     Cardiac Monitoring: None  Code Status: Full Code    Clinically Significant Risk Factors Present on Admission                 # Thrombocytopenia: Lowest platelets = 28 in last 2 days, will monitor for bleeding             # Overweight: Estimated body mass index is 26.49 kg/m  as calculated from the following:    Height as of this encounter: 1.499 m (4' 11\").    Weight as of this encounter: 59.5 kg (131 lb 2.8 oz).              Disposition Plan     Medically Ready for Discharge: Anticipated in 2-4 Days         The patient's care was discussed with the Attending Physician, Dr. Gondal.    Lorelei Mccrary PA-C  Hospitalist Service  [facility]  Securely message with [employer] (more info)  Text page via [facility] Paging/Directory     ______________________________________________________________________    Chief Complaint   Right lower quadrant abdominal pain    History is obtained from the patient w/ Cameron     History of Present Illness   Ellie Cooper is a 39 year old female who has a PMH of thrombocytopenia, miscarriage initially presented to urgent care for evaluation of right lower quadrant abdominal pain.  Patient states the abdominal pain started Sunday night, some radiation to the back.  She denies any changes to her bowel movements last bowel movement was this morning around 9 AM.  She denies any diaphoresis, no fevers or chills.  Also has had no nausea or vomiting.  States she has never had pain like this in the past. She has noted occasional bruising to her extremities in the past, none currently.  States she occasionally feels lightheaded with bending forward, denies any syncopal episodes.  She has had no chest pain, diarrhea, constipation, nausea, vomiting, lower extremity edema or dysuria.  States she has not seen a hematologist/oncologist since " 2021.  Reports after receiving 1 dose of IV Toradol at the urgent care, her abdominal pain was 5/10.    Past Medical History    No past medical history on file.    Past Surgical History   Past Surgical History:   Procedure Laterality Date    NO PAST SURGERIES         Prior to Admission Medications   Prior to Admission Medications   Prescriptions: None   Facility-Administered Medications Last Administration Doses Remaining   ketorolac (TORADOL) injection 30 mg 7/1/2025  3:03 PM 0   sodium chloride (PF) 0.9% PF flush 3 mL 7/1/2025  3:05 PM            Review of Systems    Review of Systems   Constitutional:  Negative for chills, diaphoresis, fever, malaise/fatigue and weight loss.   Eyes:  Negative for blurred vision.   Respiratory:  Negative for cough, shortness of breath and wheezing.    Cardiovascular:  Negative for chest pain, palpitations and orthopnea.   Gastrointestinal:  Positive for abdominal pain. Negative for blood in stool, heartburn, melena, nausea and vomiting.   Genitourinary:  Negative for flank pain, frequency and urgency.   Skin:  Negative for itching and rash.   Neurological:  Negative for dizziness, tingling, seizures and weakness.   Psychiatric/Behavioral:  Negative for depression.          Physical Exam   Vital Signs: Temp: 97.9  F (36.6  C) Temp src: Oral BP: 134/80 Pulse: 79   Resp: 18 SpO2: 99 % O2 Device: None (Room air)    Weight: 131 lbs 2.78 oz    Physical Exam  Constitutional:       General: She is not in acute distress.     Appearance: She is not ill-appearing, toxic-appearing or diaphoretic.   HENT:      Head: Normocephalic and atraumatic.      Mouth/Throat:      Mouth: Mucous membranes are moist.   Cardiovascular:      Rate and Rhythm: Normal rate and regular rhythm.      Heart sounds: No murmur heard.     No friction rub.   Pulmonary:      Effort: No respiratory distress.      Breath sounds: No wheezing or rales.   Abdominal:      General: Bowel sounds are normal.      Palpations:  Abdomen is soft.      Tenderness: There is abdominal tenderness in the right lower quadrant and suprapubic area.      Comments: Non rigid   Skin:     Findings: No bruising.   Neurological:      General: No focal deficit present.      Mental Status: She is alert and oriented to person, place, and time.   Psychiatric:         Mood and Affect: Mood normal.         Behavior: Behavior normal.           Medical Decision Making       75 MINUTES SPENT BY ME on the date of service doing chart review, history, exam, documentation & further activities per the note.      Data     I have personally reviewed the following data over the past 24 hrs:    13.7 (H)  \   13.7   / 28 (LL)     141 107 9 /  94   4.7 28 0.50 (L) \     ALT: 16 AST: 24 AP: 76 TBILI: 0.8   ALB: 4.0 TOT PROTEIN: 7.7 LIPASE: 17     Procal: N/A CRP: 4.00 Lactic Acid: 1.0       Ferritin:  N/A % Retic:  1.1 LDH:  N/A       Imaging results reviewed over the past 24 hrs:   Recent Results (from the past 24 hours)   CT Abdomen Pelvis w Contrast    Narrative    EXAM: CT ABDOMEN PELVIS W CONTRAST  LOCATION: [facility]  DATE: 7/1/2025    INDICATION:  RLQ abdominal pain  COMPARISON: None.  TECHNIQUE: CT scan of the abdomen and pelvis was performed following injection of IV contrast. Multiplanar reformats were obtained. Dose reduction techniques were used.  CONTRAST: Isovue 370, 64 mL    FINDINGS:   LOWER CHEST: Normal.    HEPATOBILIARY: Normal.    PANCREAS: Normal.    SPLEEN: Normal.    ADRENAL GLANDS: Normal.    KIDNEYS/BLADDER: Normal.    BOWEL: The appendix is dilated measuring up to 11 mm in diameter and has mucosal hyperenhancement particularly towards the base of the cecum. There is periappendiceal inflammatory stranding but no right lower quadrant fluid collection or free air.   Stomach and small bowel is normal. Colonic stool burden is normal.    LYMPH NODES: Normal.    VASCULATURE: Normal.    PELVIC ORGANS: Retroflexed uterus. Small  amount of free fluid in the pelvis. Physiologic follicles in the ovaries which are not enlarged.    MUSCULOSKELETAL: Normal.      Impression    IMPRESSION:     Acute, uncomplicated appendicitis.

## 2025-07-01 NOTE — PATIENT INSTRUCTIONS
Go to [facility] across the street     Go into the main door, welcome desk.    Tell them you are there to be admitted to room 109

## 2025-07-01 NOTE — LETTER
July 1, 2025      Ellie Cooper  [address] [place] [address]        To Whom It May Concern:    Ellie Cooper  was seen on 07/01/25.  Please excuse her from work.  She is being sent to the hospital.         Sincerely,        Gin Donnelly MD    Electronically signed

## 2025-07-01 NOTE — PROGRESS NOTES
Urgent Care Clinic Visit    Chief Complaint   Patient presents with    Abdominal Pain     RLQ abdominal tight pain yesterday. Normal BM.               7/1/2025    12:37 PM   Additional Questions   Roomed by Hugo Rivera MA   Accompanied by          7/1/2025   Forms   Any forms needing to be completed Yes         Hugo Rivera MA on 7/1/2025 at 12:38 PM

## 2025-07-02 ENCOUNTER — ANESTHESIA (OUTPATIENT)
Dept: SURGERY | Facility: HOSPITAL | Age: 40
End: 2025-07-02
Payer: COMMERCIAL

## 2025-07-02 ENCOUNTER — ANESTHESIA EVENT (OUTPATIENT)
Dept: SURGERY | Facility: HOSPITAL | Age: 40
End: 2025-07-02
Payer: COMMERCIAL

## 2025-07-02 VITALS
RESPIRATION RATE: 16 BRPM | SYSTOLIC BLOOD PRESSURE: 121 MMHG | WEIGHT: 131.17 LBS | OXYGEN SATURATION: 96 % | BODY MASS INDEX: 26.44 KG/M2 | HEART RATE: 86 BPM | DIASTOLIC BLOOD PRESSURE: 77 MMHG | TEMPERATURE: 98.3 F | HEIGHT: 59 IN

## 2025-07-02 PROBLEM — K35.80 ACUTE APPENDICITIS, UNSPECIFIED ACUTE APPENDICITIS TYPE: Status: ACTIVE | Noted: 2025-07-02

## 2025-07-02 PROBLEM — K35.30 ACUTE APPENDICITIS WITH LOCALIZED PERITONITIS, WITHOUT PERFORATION, ABSCESS, OR GANGRENE: Status: ACTIVE | Noted: 2025-07-02

## 2025-07-02 LAB
ALBUMIN SERPL BCG-MCNC: 4 G/DL (ref 3.5–5.2)
ALP SERPL-CCNC: 91 U/L (ref 40–150)
ALT SERPL W P-5'-P-CCNC: 11 U/L (ref 0–50)
ANION GAP SERPL CALCULATED.3IONS-SCNC: 8 MMOL/L (ref 7–15)
AST SERPL W P-5'-P-CCNC: 12 U/L (ref 0–45)
BASOPHILS # BLD AUTO: 0 10E3/UL (ref 0–0.2)
BASOPHILS NFR BLD AUTO: 0 %
BILIRUB SERPL-MCNC: 1 MG/DL
BLD PROD TYP BPU: NORMAL
BLD PROD TYP BPU: NORMAL
BLOOD COMPONENT TYPE: NORMAL
BLOOD COMPONENT TYPE: NORMAL
BUN SERPL-MCNC: 7.7 MG/DL (ref 6–20)
CALCIUM SERPL-MCNC: 8.9 MG/DL (ref 8.8–10.4)
CHLORIDE SERPL-SCNC: 107 MMOL/L (ref 98–107)
CODING SYSTEM: NORMAL
CODING SYSTEM: NORMAL
CREAT SERPL-MCNC: 0.58 MG/DL (ref 0.51–0.95)
EGFRCR SERPLBLD CKD-EPI 2021: >90 ML/MIN/1.73M2
EOSINOPHIL # BLD AUTO: 0 10E3/UL (ref 0–0.7)
EOSINOPHIL NFR BLD AUTO: 0 %
ERYTHROCYTE [DISTWIDTH] IN BLOOD BY AUTOMATED COUNT: 12.8 % (ref 10–15)
EST. AVERAGE GLUCOSE BLD GHB EST-MCNC: 108 MG/DL
GLUCOSE SERPL-MCNC: 154 MG/DL (ref 70–99)
HBA1C MFR BLD: 5.4 %
HCO3 SERPL-SCNC: 26 MMOL/L (ref 22–29)
HCT VFR BLD AUTO: 41.5 % (ref 35–47)
HGB BLD-MCNC: 13.7 G/DL (ref 11.7–15.7)
IMM GRANULOCYTES # BLD: 0 10E3/UL
IMM GRANULOCYTES NFR BLD: 1 %
ISSUE DATE AND TIME: NORMAL
ISSUE DATE AND TIME: NORMAL
LYMPHOCYTES # BLD AUTO: 0.7 10E3/UL (ref 0.8–5.3)
LYMPHOCYTES NFR BLD AUTO: 10 %
MAGNESIUM SERPL-MCNC: 2.2 MG/DL (ref 1.7–2.3)
MCH RBC QN AUTO: 27.8 PG (ref 26.5–33)
MCHC RBC AUTO-ENTMCNC: 33 G/DL (ref 31.5–36.5)
MCV RBC AUTO: 84 FL (ref 78–100)
MONOCYTES # BLD AUTO: 0.1 10E3/UL (ref 0–1.3)
MONOCYTES NFR BLD AUTO: 1 %
NEUTROPHILS # BLD AUTO: 5.8 10E3/UL (ref 1.6–8.3)
NEUTROPHILS NFR BLD AUTO: 88 %
NRBC # BLD AUTO: 0 10E3/UL
NRBC BLD AUTO-RTO: 0 /100
PATH REPORT.COMMENTS IMP SPEC: NORMAL
PATH REPORT.COMMENTS IMP SPEC: NORMAL
PATH REPORT.FINAL DX SPEC: NORMAL
PATH REPORT.MICROSCOPIC SPEC OTHER STN: NORMAL
PATH REPORT.RELEVANT HX SPEC: NORMAL
PHOSPHATE SERPL-MCNC: 2.4 MG/DL (ref 2.5–4.5)
PLATELET # BLD AUTO: 211 10E3/UL (ref 150–450)
POTASSIUM SERPL-SCNC: 3.8 MMOL/L (ref 3.4–5.3)
PROT SERPL-MCNC: 7.6 G/DL (ref 6.4–8.3)
RBC # BLD AUTO: 4.93 10E6/UL (ref 3.8–5.2)
SODIUM SERPL-SCNC: 141 MMOL/L (ref 135–145)
UNIT ABO/RH: NORMAL
UNIT ABO/RH: NORMAL
UNIT NUMBER: NORMAL
UNIT NUMBER: NORMAL
UNIT STATUS: NORMAL
UNIT STATUS: NORMAL
UNIT TYPE ISBT: 6200
UNIT TYPE ISBT: 7300
WBC # BLD AUTO: 6.6 10E3/UL (ref 4–11)

## 2025-07-02 PROCEDURE — G0378 HOSPITAL OBSERVATION PER HR: HCPCS

## 2025-07-02 PROCEDURE — 250N000011 HC RX IP 250 OP 636: Performed by: ANESTHESIOLOGY

## 2025-07-02 PROCEDURE — 710N000009 HC RECOVERY PHASE 1, LEVEL 1, PER MIN: Performed by: SPECIALIST

## 2025-07-02 PROCEDURE — 99239 HOSP IP/OBS DSCHRG MGMT >30: CPT | Performed by: HOSPITALIST

## 2025-07-02 PROCEDURE — 250N000011 HC RX IP 250 OP 636: Performed by: SURGERY

## 2025-07-02 PROCEDURE — 258N000003 HC RX IP 258 OP 636: Performed by: SPECIALIST

## 2025-07-02 PROCEDURE — 85004 AUTOMATED DIFF WBC COUNT: CPT

## 2025-07-02 PROCEDURE — 999N000141 HC STATISTIC PRE-PROCEDURE NURSING ASSESSMENT: Performed by: SPECIALIST

## 2025-07-02 PROCEDURE — 96376 TX/PRO/DX INJ SAME DRUG ADON: CPT

## 2025-07-02 PROCEDURE — 84155 ASSAY OF PROTEIN SERUM: CPT

## 2025-07-02 PROCEDURE — 83735 ASSAY OF MAGNESIUM: CPT | Performed by: STUDENT IN AN ORGANIZED HEALTH CARE EDUCATION/TRAINING PROGRAM

## 2025-07-02 PROCEDURE — 36430 TRANSFUSION BLD/BLD COMPNT: CPT

## 2025-07-02 PROCEDURE — P9035 PLATELET PHERES LEUKOREDUCED: HCPCS | Performed by: ANESTHESIOLOGY

## 2025-07-02 PROCEDURE — P9035 PLATELET PHERES LEUKOREDUCED: HCPCS

## 2025-07-02 PROCEDURE — 88304 TISSUE EXAM BY PATHOLOGIST: CPT | Mod: TC | Performed by: SPECIALIST

## 2025-07-02 PROCEDURE — 370N000017 HC ANESTHESIA TECHNICAL FEE, PER MIN: Performed by: SPECIALIST

## 2025-07-02 PROCEDURE — 258N000003 HC RX IP 258 OP 636: Performed by: ANESTHESIOLOGY

## 2025-07-02 PROCEDURE — 250N000013 HC RX MED GY IP 250 OP 250 PS 637: Performed by: HOSPITALIST

## 2025-07-02 PROCEDURE — 84100 ASSAY OF PHOSPHORUS: CPT | Performed by: STUDENT IN AN ORGANIZED HEALTH CARE EDUCATION/TRAINING PROGRAM

## 2025-07-02 PROCEDURE — 250N000011 HC RX IP 250 OP 636: Performed by: NURSE ANESTHETIST, CERTIFIED REGISTERED

## 2025-07-02 PROCEDURE — 36415 COLL VENOUS BLD VENIPUNCTURE: CPT

## 2025-07-02 PROCEDURE — 250N000009 HC RX 250: Performed by: SPECIALIST

## 2025-07-02 PROCEDURE — 258N000003 HC RX IP 258 OP 636: Performed by: NURSE ANESTHETIST, CERTIFIED REGISTERED

## 2025-07-02 PROCEDURE — 250N000011 HC RX IP 250 OP 636

## 2025-07-02 PROCEDURE — 258N000003 HC RX IP 258 OP 636: Performed by: STUDENT IN AN ORGANIZED HEALTH CARE EDUCATION/TRAINING PROGRAM

## 2025-07-02 PROCEDURE — 360N000076 HC SURGERY LEVEL 3, PER MIN: Performed by: SPECIALIST

## 2025-07-02 PROCEDURE — 250N000009 HC RX 250: Performed by: NURSE ANESTHETIST, CERTIFIED REGISTERED

## 2025-07-02 PROCEDURE — 250N000013 HC RX MED GY IP 250 OP 250 PS 637

## 2025-07-02 PROCEDURE — 83036 HEMOGLOBIN GLYCOSYLATED A1C: CPT | Performed by: HOSPITALIST

## 2025-07-02 PROCEDURE — 88304 TISSUE EXAM BY PATHOLOGIST: CPT | Mod: 26 | Performed by: PATHOLOGY

## 2025-07-02 PROCEDURE — 250N000011 HC RX IP 250 OP 636: Performed by: SPECIALIST

## 2025-07-02 PROCEDURE — 44970 LAPAROSCOPY APPENDECTOMY: CPT | Performed by: SPECIALIST

## 2025-07-02 PROCEDURE — 272N000001 HC OR GENERAL SUPPLY STERILE: Performed by: SPECIALIST

## 2025-07-02 RX ORDER — ONDANSETRON 4 MG/1
4 TABLET, ORALLY DISINTEGRATING ORAL EVERY 30 MIN PRN
Status: CANCELLED | OUTPATIENT
Start: 2025-07-02

## 2025-07-02 RX ORDER — MAGNESIUM HYDROXIDE 1200 MG/15ML
LIQUID ORAL PRN
Status: DISCONTINUED | OUTPATIENT
Start: 2025-07-02 | End: 2025-07-02 | Stop reason: HOSPADM

## 2025-07-02 RX ORDER — DEXAMETHASONE SODIUM PHOSPHATE 4 MG/ML
4 INJECTION, SOLUTION INTRA-ARTICULAR; INTRALESIONAL; INTRAMUSCULAR; INTRAVENOUS; SOFT TISSUE
Status: CANCELLED | OUTPATIENT
Start: 2025-07-02

## 2025-07-02 RX ORDER — DEXAMETHASONE SODIUM PHOSPHATE 10 MG/ML
INJECTION, SOLUTION INTRAMUSCULAR; INTRAVENOUS PRN
Status: DISCONTINUED | OUTPATIENT
Start: 2025-07-02 | End: 2025-07-02

## 2025-07-02 RX ORDER — LIDOCAINE HYDROCHLORIDE 10 MG/ML
INJECTION, SOLUTION INFILTRATION; PERINEURAL PRN
Status: DISCONTINUED | OUTPATIENT
Start: 2025-07-02 | End: 2025-07-02

## 2025-07-02 RX ORDER — CEFAZOLIN SODIUM/WATER 2 G/20 ML
2 SYRINGE (ML) INTRAVENOUS
Status: COMPLETED | OUTPATIENT
Start: 2025-07-02 | End: 2025-07-02

## 2025-07-02 RX ORDER — NALOXONE HYDROCHLORIDE 0.4 MG/ML
0.1 INJECTION, SOLUTION INTRAMUSCULAR; INTRAVENOUS; SUBCUTANEOUS
Status: DISCONTINUED | OUTPATIENT
Start: 2025-07-02 | End: 2025-07-02 | Stop reason: HOSPADM

## 2025-07-02 RX ORDER — HYDROCODONE BITARTRATE AND ACETAMINOPHEN 5; 325 MG/1; MG/1
1 TABLET ORAL
Status: DISCONTINUED | OUTPATIENT
Start: 2025-07-02 | End: 2025-07-02

## 2025-07-02 RX ORDER — ACETAMINOPHEN 325 MG/1
650 TABLET ORAL EVERY 6 HOURS PRN
Status: DISCONTINUED | OUTPATIENT
Start: 2025-07-02 | End: 2025-07-02 | Stop reason: HOSPADM

## 2025-07-02 RX ORDER — HYDROMORPHONE HCL IN WATER/PF 6 MG/30 ML
0.4 PATIENT CONTROLLED ANALGESIA SYRINGE INTRAVENOUS EVERY 5 MIN PRN
Status: DISCONTINUED | OUTPATIENT
Start: 2025-07-02 | End: 2025-07-02 | Stop reason: HOSPADM

## 2025-07-02 RX ORDER — HYDROCODONE BITARTRATE AND ACETAMINOPHEN 5; 325 MG/1; MG/1
1 TABLET ORAL EVERY 4 HOURS PRN
Refills: 0 | Status: DISCONTINUED | OUTPATIENT
Start: 2025-07-02 | End: 2025-07-02 | Stop reason: HOSPADM

## 2025-07-02 RX ORDER — CEFAZOLIN SODIUM/WATER 2 G/20 ML
2 SYRINGE (ML) INTRAVENOUS SEE ADMIN INSTRUCTIONS
Status: DISCONTINUED | OUTPATIENT
Start: 2025-07-02 | End: 2025-07-02 | Stop reason: HOSPADM

## 2025-07-02 RX ORDER — SODIUM CHLORIDE, SODIUM LACTATE, POTASSIUM CHLORIDE, CALCIUM CHLORIDE 600; 310; 30; 20 MG/100ML; MG/100ML; MG/100ML; MG/100ML
INJECTION, SOLUTION INTRAVENOUS CONTINUOUS
Status: DISCONTINUED | OUTPATIENT
Start: 2025-07-02 | End: 2025-07-02 | Stop reason: HOSPADM

## 2025-07-02 RX ORDER — HYDROCODONE BITARTRATE AND ACETAMINOPHEN 5; 325 MG/1; MG/1
1 TABLET ORAL EVERY 4 HOURS PRN
Qty: 12 TABLET | Refills: 0 | Status: SHIPPED | OUTPATIENT
Start: 2025-07-02

## 2025-07-02 RX ORDER — OXYCODONE HYDROCHLORIDE 5 MG/1
10 TABLET ORAL
Refills: 0 | Status: CANCELLED | OUTPATIENT
Start: 2025-07-02

## 2025-07-02 RX ORDER — SODIUM CHLORIDE, SODIUM LACTATE, POTASSIUM CHLORIDE, AND CALCIUM CHLORIDE .6; .31; .03; .02 G/100ML; G/100ML; G/100ML; G/100ML
IRRIGANT IRRIGATION PRN
Status: DISCONTINUED | OUTPATIENT
Start: 2025-07-02 | End: 2025-07-02 | Stop reason: HOSPADM

## 2025-07-02 RX ORDER — SODIUM CHLORIDE, SODIUM LACTATE, POTASSIUM CHLORIDE, CALCIUM CHLORIDE 600; 310; 30; 20 MG/100ML; MG/100ML; MG/100ML; MG/100ML
INJECTION, SOLUTION INTRAVENOUS CONTINUOUS PRN
Status: DISCONTINUED | OUTPATIENT
Start: 2025-07-02 | End: 2025-07-02

## 2025-07-02 RX ORDER — PROPOFOL 10 MG/ML
INJECTION, EMULSION INTRAVENOUS CONTINUOUS PRN
Status: DISCONTINUED | OUTPATIENT
Start: 2025-07-02 | End: 2025-07-02

## 2025-07-02 RX ORDER — BUPIVACAINE HYDROCHLORIDE 2.5 MG/ML
INJECTION, SOLUTION INFILTRATION; PERINEURAL PRN
Status: DISCONTINUED | OUTPATIENT
Start: 2025-07-02 | End: 2025-07-02 | Stop reason: HOSPADM

## 2025-07-02 RX ORDER — FENTANYL CITRATE 50 UG/ML
50 INJECTION, SOLUTION INTRAMUSCULAR; INTRAVENOUS EVERY 5 MIN PRN
Status: DISCONTINUED | OUTPATIENT
Start: 2025-07-02 | End: 2025-07-02 | Stop reason: HOSPADM

## 2025-07-02 RX ORDER — AMOXICILLIN 250 MG
1 CAPSULE ORAL 2 TIMES DAILY
Qty: 60 TABLET | Refills: 0 | Status: SHIPPED | OUTPATIENT
Start: 2025-07-02

## 2025-07-02 RX ORDER — HYDROMORPHONE HCL IN WATER/PF 6 MG/30 ML
0.2 PATIENT CONTROLLED ANALGESIA SYRINGE INTRAVENOUS EVERY 5 MIN PRN
Status: DISCONTINUED | OUTPATIENT
Start: 2025-07-02 | End: 2025-07-02 | Stop reason: HOSPADM

## 2025-07-02 RX ORDER — DEXAMETHASONE SODIUM PHOSPHATE 4 MG/ML
4 INJECTION, SOLUTION INTRA-ARTICULAR; INTRALESIONAL; INTRAMUSCULAR; INTRAVENOUS; SOFT TISSUE
Status: DISCONTINUED | OUTPATIENT
Start: 2025-07-02 | End: 2025-07-02 | Stop reason: HOSPADM

## 2025-07-02 RX ORDER — ONDANSETRON 4 MG/1
4 TABLET, ORALLY DISINTEGRATING ORAL EVERY 30 MIN PRN
Status: DISCONTINUED | OUTPATIENT
Start: 2025-07-02 | End: 2025-07-02 | Stop reason: HOSPADM

## 2025-07-02 RX ORDER — ONDANSETRON 2 MG/ML
4 INJECTION INTRAMUSCULAR; INTRAVENOUS EVERY 30 MIN PRN
Status: CANCELLED | OUTPATIENT
Start: 2025-07-02

## 2025-07-02 RX ORDER — FENTANYL CITRATE 50 UG/ML
INJECTION, SOLUTION INTRAMUSCULAR; INTRAVENOUS PRN
Status: DISCONTINUED | OUTPATIENT
Start: 2025-07-02 | End: 2025-07-02

## 2025-07-02 RX ORDER — FENTANYL CITRATE 50 UG/ML
25 INJECTION, SOLUTION INTRAMUSCULAR; INTRAVENOUS EVERY 5 MIN PRN
Status: DISCONTINUED | OUTPATIENT
Start: 2025-07-02 | End: 2025-07-02 | Stop reason: HOSPADM

## 2025-07-02 RX ORDER — ACETAMINOPHEN 650 MG/1
650 SUPPOSITORY RECTAL EVERY 6 HOURS PRN
Status: DISCONTINUED | OUTPATIENT
Start: 2025-07-02 | End: 2025-07-02 | Stop reason: HOSPADM

## 2025-07-02 RX ORDER — IBUPROFEN 600 MG/1
600 TABLET, FILM COATED ORAL
Status: DISCONTINUED | OUTPATIENT
Start: 2025-07-02 | End: 2025-07-02

## 2025-07-02 RX ORDER — ONDANSETRON 2 MG/ML
INJECTION INTRAMUSCULAR; INTRAVENOUS PRN
Status: DISCONTINUED | OUTPATIENT
Start: 2025-07-02 | End: 2025-07-02

## 2025-07-02 RX ORDER — OXYCODONE HYDROCHLORIDE 5 MG/1
5 TABLET ORAL
Refills: 0 | Status: CANCELLED | OUTPATIENT
Start: 2025-07-02

## 2025-07-02 RX ORDER — PROPOFOL 10 MG/ML
INJECTION, EMULSION INTRAVENOUS PRN
Status: DISCONTINUED | OUTPATIENT
Start: 2025-07-02 | End: 2025-07-02

## 2025-07-02 RX ORDER — ONDANSETRON 2 MG/ML
4 INJECTION INTRAMUSCULAR; INTRAVENOUS EVERY 30 MIN PRN
Status: DISCONTINUED | OUTPATIENT
Start: 2025-07-02 | End: 2025-07-02 | Stop reason: HOSPADM

## 2025-07-02 RX ORDER — NALOXONE HYDROCHLORIDE 1 MG/ML
0.1 INJECTION INTRAMUSCULAR; INTRAVENOUS; SUBCUTANEOUS
Status: CANCELLED | OUTPATIENT
Start: 2025-07-02

## 2025-07-02 RX ORDER — AMOXICILLIN 250 MG
1 CAPSULE ORAL 2 TIMES DAILY
Status: DISCONTINUED | OUTPATIENT
Start: 2025-07-02 | End: 2025-07-02 | Stop reason: HOSPADM

## 2025-07-02 RX ADMIN — PIPERACILLIN AND TAZOBACTAM 3.38 G: 3; .375 INJECTION, POWDER, FOR SOLUTION INTRAVENOUS at 12:15

## 2025-07-02 RX ADMIN — LIDOCAINE HYDROCHLORIDE 50 MG: 10 INJECTION, SOLUTION INFILTRATION; PERINEURAL at 07:40

## 2025-07-02 RX ADMIN — HYDROMORPHONE HYDROCHLORIDE 0.5 MG: 1 INJECTION, SOLUTION INTRAMUSCULAR; INTRAVENOUS; SUBCUTANEOUS at 07:54

## 2025-07-02 RX ADMIN — SODIUM CHLORIDE: 0.9 INJECTION, SOLUTION INTRAVENOUS at 12:15

## 2025-07-02 RX ADMIN — SODIUM CHLORIDE, SODIUM LACTATE, POTASSIUM CHLORIDE, AND CALCIUM CHLORIDE: .6; .31; .03; .02 INJECTION, SOLUTION INTRAVENOUS at 07:32

## 2025-07-02 RX ADMIN — OXYCODONE HYDROCHLORIDE 5 MG: 5 TABLET ORAL at 10:14

## 2025-07-02 RX ADMIN — MIDAZOLAM HYDROCHLORIDE 2 MG: 1 INJECTION, SOLUTION INTRAMUSCULAR; INTRAVENOUS at 07:32

## 2025-07-02 RX ADMIN — DEXAMETHASONE SODIUM PHOSPHATE 4 MG: 10 INJECTION, SOLUTION INTRAMUSCULAR; INTRAVENOUS at 07:43

## 2025-07-02 RX ADMIN — HYDROCODONE BITARTRATE AND ACETAMINOPHEN 1 TABLET: 5; 325 TABLET ORAL at 17:03

## 2025-07-02 RX ADMIN — PROPOFOL 150 MCG/KG/MIN: 10 INJECTION, EMULSION INTRAVENOUS at 07:40

## 2025-07-02 RX ADMIN — FENTANYL CITRATE 25 MCG: 50 INJECTION, SOLUTION INTRAMUSCULAR; INTRAVENOUS at 09:15

## 2025-07-02 RX ADMIN — SODIUM CHLORIDE, SODIUM LACTATE, POTASSIUM CHLORIDE, AND CALCIUM CHLORIDE: .6; .31; .03; .02 INJECTION, SOLUTION INTRAVENOUS at 09:22

## 2025-07-02 RX ADMIN — PROPOFOL 150 MG: 10 INJECTION, EMULSION INTRAVENOUS at 07:40

## 2025-07-02 RX ADMIN — PHENYLEPHRINE HYDROCHLORIDE 200 MCG: 10 INJECTION INTRAVENOUS at 07:47

## 2025-07-02 RX ADMIN — FENTANYL CITRATE 100 MCG: 50 INJECTION, SOLUTION INTRAMUSCULAR; INTRAVENOUS at 07:40

## 2025-07-02 RX ADMIN — HYDROMORPHONE HYDROCHLORIDE 0.2 MG: 0.2 INJECTION, SOLUTION INTRAMUSCULAR; INTRAVENOUS; SUBCUTANEOUS at 12:09

## 2025-07-02 RX ADMIN — PIPERACILLIN AND TAZOBACTAM 3.38 G: 3; .375 INJECTION, POWDER, FOR SOLUTION INTRAVENOUS at 06:18

## 2025-07-02 RX ADMIN — ONDANSETRON 4 MG: 2 INJECTION INTRAMUSCULAR; INTRAVENOUS at 07:49

## 2025-07-02 RX ADMIN — Medication 2 G: at 07:37

## 2025-07-02 RX ADMIN — ROCURONIUM 50 MG: 50 INJECTION, SOLUTION INTRAVENOUS at 07:40

## 2025-07-02 RX ADMIN — SUGAMMADEX 200 MG: 100 INJECTION, SOLUTION INTRAVENOUS at 08:18

## 2025-07-02 ASSESSMENT — ACTIVITIES OF DAILY LIVING (ADL)
ADLS_ACUITY_SCORE: 21
ADLS_ACUITY_SCORE: 20
ADLS_ACUITY_SCORE: 21
ADLS_ACUITY_SCORE: 21
ADLS_ACUITY_SCORE: 15
ADLS_ACUITY_SCORE: 21
ADLS_ACUITY_SCORE: 20
ADLS_ACUITY_SCORE: 20
ADLS_ACUITY_SCORE: 21
ADLS_ACUITY_SCORE: 15
ADLS_ACUITY_SCORE: 15
ADLS_ACUITY_SCORE: 21
ADLS_ACUITY_SCORE: 15
ADLS_ACUITY_SCORE: 20
ADLS_ACUITY_SCORE: 20

## 2025-07-02 NOTE — DISCHARGE SUMMARY
[facility] MEDICINE  DISCHARGE SUMMARY     Primary Care Physician: Nicol Pacheco  Admission Date: 7/1/2025   Discharge Provider: Patricia Gimenez MD Discharge Date: 7/2/2025   Diet:   Active Diet and Nourishment Order   Procedures    Advance Diet as Tolerated: Clear Liquid Diet; Regular Diet Adult    Diet       Code Status: Full Code           Condition at Discharge: Good     REASON FOR PRESENTATION(See Admission Note for Details)   Abd pain    PRINCIPAL & ACTIVE DISCHARGE DIAGNOSES     Principal Problem:    Acute appendicitis  Active Problems:    Thrombocytopenia    History of miscarriage    Acute appendicitis, unspecified acute appendicitis type    Acute appendicitis with localized peritonitis, without perforation, abscess, or gangrene      PENDING LABS     Unresulted Labs Ordered in the Past 30 Days of this Admission       Date and Time Order Name Status Description    7/2/2025  8:15 AM Surgical Pathology Exam In process     7/1/2025  5:49 PM Blood Culture Peripheral blood (BC) Arm, Left Preliminary     7/1/2025  5:49 PM Blood Culture Peripheral blood (BC) Hand, Right Preliminary             PROCEDURES ( this hospitalization only)      Procedure(s):  APPENDECTOMY, LAPAROSCOPIC    RECOMMENDATIONS TO OUTPATIENT PROVIDER FOR F/U VISIT     Follow-up Appointments       Follow Up      Follow up with Hematology about your low platelet count. Call [facility] at [phone] to schedule.  Follow up with surgeon as needed.        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  She received platelets preoperatively, surgery was uneventful, cleared for discharge by Hematology. Hospital Day: 2        Acute uncomplicated appendicitis  - CT abdomen pelvis w/ contrast: Acute, uncomplicated appendicitis   - Presented with WBC 16.3.  Did not meet criteria for sepsis  -now post lap appendectomy by Dr. 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  Has not followed up with hematology/oncology since.   - Platelets on admission 31k  - got 2 units of platelets preoperatively, today platelet 211k  - got a few doses of IV steroids here, seen by hematology did not feel she needs to continue on steroid. She needs to follow up with them in 1 month, importance of this was emphasized to the patient.     Elevated random glucose  -154  - A1c 5.4    Discharge Medications with Med changes:     Current Discharge Medication List        START taking these medications    Details   HYDROcodone-acetaminophen (NORCO) 5-325 MG tablet Take 1 tablet by mouth every 4 hours as needed for moderate to severe pain.  Qty: 12 tablet, Refills: 0    Associated Diagnoses: Acute appendicitis with localized peritonitis, without perforation, abscess, or gangrene      senna-docusate (SENOKOT-S/PERICOLACE) 8.6-50 MG tablet Take 1 tablet by mouth 2 times daily. While taking hydrocodone.  Qty: 60 tablet, Refills: 0    Associated Diagnoses: Acute appendicitis, unspecified acute appendicitis type               Consults     SURGERY GENERAL IP CONSULT  HEMATOLOGY & ONCOLOGY IP CONSULT        SIGNIFICANT IMAGING FINDINGS     No results found for this visit on  07/01/25.    SIGNIFICANT LABORATORY FINDINGS     See emr    Discharge Orders        When to call - Contact Surgeon Team    You may experience symptoms that require follow-up before your scheduled appointment. Contact your Surgeon Team if you are concerned about pain control, large amount of bleeding, blood clots, constipation, or if you experience signs of infection (fever, growing tenderness at the surgery site, a large amount of drainage, severe pain, foul-smelling drainage, redness or swelling.  Please call [phone] if you do have any questions or concerns     When to call - Reasons to Call 911    Call 911 immediately if you experience sudden-onset chest pain, arm weakness/numbness, slurred speech, or shortness of breath     Symptoms - Fever Management    A low grade fever can be expected after surgery. Your Provider many have prescribed an Opioid pain medication that also contains acetaminophen (TYLENOL) that may help with Fever management.  Do NOT take additional acetaminophen (TYLENOL) in combination with an Opioid/acetaminophen (TYLENOL) product. Read the labels on your Over The Counter (OTC) medications with care.     Diet Instructions    You may drink clear liquids (apple juice, ginger ale, 7-up, broth, etc.), and progress to your regular diet as you feel able. It is important to stay well-hydrated after surgery and drink plenty of water.     Shower/Bathing - Restrictions: Let water run over incisions and pat dry. No tub baths until bleeding stops.    Shower/Bathing - Restrictions: Let water run over incisions and pat dry. Do NOT soak in any body of water (lake, pool, bath, etc.) for 7-10 days postoperatively.     Dressing / Wound Care - Wound    Remove Band-Aids in 1 to 2 days.  Leave Steri-Strips for 7 to 10 days.  The sutures are underneath the skin and will dissolve on their own.     Discharge Instructions - Comfort and Pain Management    Pain after surgery is normal and expected. You will have some  amount of pain after surgery. Your pain will improve with time. There are several things you can do to help reduce your pain including: rest, ice, and using pain medications as needed. Use pain interventions and don't wait until pain level is out of control. Contact your Surgeon Team if you have pain that persists or worsens after surgery despite rest, ice, and taking your medication(s) as prescribed. You may have a dry mouth, a sore throat, muscles aches or trouble sleeping, and these symptoms should go away after 24 hours.     Discharge Instructions - Rest    Rest and relax for the next 24 hours. Make arrangements to have someone stay with you overnight, and avoid hazardous and strenuous activities.  Do NOT make any important decisions for the next 24 hours.     Return to normal activity as tolerated    Return to normal activity as tolerated     May return to work (WITHOUT restrictions)    May return to work as you feel up to it.  Everyone is different.  Average time off work is approximately 1 week.     Reason for your hospital stay    appendicitis     Activity    Your activity upon discharge: activity as tolerated     When to contact your care team    Call your primary doctor if you have any of the following: chest pain, shortness of breath, fever, chills, fainting, dizziness, vomiting, constipation, dehydration, worsening pain, bleeding, skin or eyes turning yellow, or trouble urinating, or any other symptoms that are new or concerning to you.     Follow Up    Follow up with Hematology about your low platelet count. Call [facility] at [phone] to schedule.  Follow up with surgeon as needed.     Diet    Follow this diet upon discharge: start with liquids like rice porridge, ice cream, yogurt and you can cautiously eat more solid food if you tolerate that.     Hospital Follow-up with Existing Primary Care Provider (PCP)            Examination   Physical Exam   Temp:  [97.4  F (36.3   C)-98.3  F (36.8  C)] 98.3  F (36.8  C)  Pulse:  [58-88] 86  Resp:  [16-23] 16  BP: ()/(60-83) 121/77  SpO2:  [94 %-99 %] 96 %  Wt Readings from Last 1 Encounters:   07/01/25 59.5 kg (131 lb 2.8 oz)         Please see EMR for more detailed significant labs, imaging, consultant notes etc.    I, Patricia Gimenez MD, personally saw the patient today and spent greater than 30 minutes discharging this patient.    Patricia Gimenez MD  [facility]    CC:Nicol Pacheco

## 2025-07-02 NOTE — PHARMACY-ADMISSION MEDICATION HISTORY
Pharmacist Admission Medication History    Admission medication history is complete. The information provided in this note is only as accurate as the sources available at the time of the update.    Information Source(s): Patient via in-person    Pertinent Information: Patient takes no current home medications.    Changes made to PTA medication list:  Added: None  Deleted: Augmented Betamethasone, Cetirizine, Dexamethasone , Hydroxyzine   Changed: None    Allergies reviewed with patient and updates made in EHR: yes    Medication History Completed By: Jasson Aceves RPH 7/1/2025 7:52 PM    Current Facility-Administered Medications for the 7/1/25 encounter (Hospital Encounter)   Medication    [COMPLETED] ketorolac (TORADOL) injection 30 mg    sodium chloride (PF) 0.9% PF flush 3 mL     No outpatient medications have been marked as taking for the 7/1/25 encounter (Hospital Encounter).

## 2025-07-02 NOTE — ANESTHESIA POSTPROCEDURE EVALUATION
Patient: Ellie Cooper    Procedure: Procedure(s):  APPENDECTOMY, LAPAROSCOPIC       Anesthesia Type:  General    Note:  Disposition: Outpatient   Postop Pain Control: Uneventful            Sign Out: Well controlled pain   PONV: No   Neuro/Psych: Uneventful            Sign Out: Acceptable/Baseline neuro status   Airway/Respiratory: Uneventful            Sign Out: Acceptable/Baseline resp. status   CV/Hemodynamics: Uneventful            Sign Out: Acceptable CV status; No obvious hypovolemia; No obvious fluid overload   Other NRE: NONE   DID A NON-ROUTINE EVENT OCCUR? No           Last vitals:  Vitals Value Taken Time   BP 98/64 07/02/25 09:30   Temp     Pulse 89 07/02/25 09:33   Resp 20 07/02/25 09:32   SpO2 97 % 07/02/25 09:33   Vitals shown include unfiled device data.    Electronically Signed By: Francisco Roman MD  July 2, 2025  12:34 PM

## 2025-07-02 NOTE — ANESTHESIA CARE TRANSFER NOTE
Patient: Ellie Cooper    Procedure: Procedure(s):  APPENDECTOMY, LAPAROSCOPIC       Diagnosis: Acute appendicitis, unspecified acute appendicitis type [K35.80]  Thrombocytopenia, unspecified [D69.6]  Diagnosis Additional Information: No value filed.    Anesthesia Type:   General     Note:    Oropharynx: oropharynx clear of all foreign objects and spontaneously breathing  Level of Consciousness: awake  Oxygen Supplementation: face mask  Level of Supplemental Oxygen (L/min / FiO2): 6  Independent Airway: airway patency satisfactory and stable  Dentition: dentition unchanged  Vital Signs Stable: post-procedure vital signs reviewed and stable  Report to RN Given: handoff report given  Patient transferred to: PACU    Handoff Report: Identifed the Patient, Identified the Reponsible Provider, Reviewed the pertinent medical history, Discussed the surgical course, Reviewed Intra-OP anesthesia mangement and issues during anesthesia, Set expectations for post-procedure period and Allowed opportunity for questions and acknowledgement of understanding      Vitals:  Vitals Value Taken Time   BP 99/62 07/02/25   0838   Temp 98.1 07/02/25   0838   Pulse 91 07/02/25   0838   Resp 12 07/02/25  0838   SpO2 100 07/02/25   0838       Electronically Signed By: CAMILA Bertrand CRNA  July 2, 2025  8:37 AM

## 2025-07-02 NOTE — PLAN OF CARE
Goal Outcome Evaluation:    Pt A/O x4. C/O abdominal/gas pains, PRN tylenol and dilaudid given. NS infusing at 75 mL/hr. 2 units of platelets infused per order. Platelets improved to 211. NPO since midnight. Plan for lap appy at 0730. Independent in room, spouse stayed the night. VSS.    Temp: 97.6  F (36.4  C) Temp src: Oral BP: 111/69 Pulse: 87   Resp: 16 SpO2: 98 % O2 Device: None (Room air)

## 2025-07-02 NOTE — CONSULTS
Surgery Note:  Case discussed with Walk-in clinic physician. 38 yo female with acute appendicitis.  CT reviewed.  Platelets at 31.  Will need platelet transfusion prior to surgery which is scheduled for 9 a.m. tomorrow.  Currently on hospitalist service and will be npo after midnight.  Discussed with patient's nurse as well. Full consult to follow.     Ulises Stubbs DO [facility]  [phone]

## 2025-07-02 NOTE — PROVIDER NOTIFICATION
Notified provider Hermann of critical platelets at 28. Provider aware. 2 units of platelets already ordered.

## 2025-07-02 NOTE — PROGRESS NOTES
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  She received platelets preoperatively, surgery was uneventful, now on IV steroids for ITP. Hospital Day: 2       Acute uncomplicated appendicitis  - CT abdomen pelvis w/ contrast: Acute, uncomplicated appendicitis   - Presented with WBC 16.3.  Did not meet criteria for sepsis  -now * Day of Surgery * from lap appendectomy by Dr. 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  Has not followed up with hematology/oncology since.   - Platelets on admission 31k  - got 2 units of platelets preoperatively, today platelet 211k  - Started on course of 40 mg IV dexamethasone x 4 days  - Avoid all anticoagulation  - peripheral smear pending  - Hematology consulted    Elevated random glucose  -154  - Check A1c    Mild hypophosphatemia  - Resume diet and recheck in the morning          Diet: Advance Diet as Tolerated: Clear Liquid Diet; Regular Diet Adult  DVT Prophylaxis: Moderate risk. Pharmacologic prophylaxis contraindicated due to thrombocytopenia   Gonzalez Catheter: Not present  Lines: None     Cardiac Monitoring: None  Code Status: Full Code      Clinically Significant Risk Factors Present on Admission                 # Thrombocytopenia: Lowest platelets = 28 in last 2 days,  "will monitor for bleeding             # Overweight: Estimated body mass index is 26.49 kg/m  as calculated from the following:    Height as of this encounter: 1.499 m (4' 11\").    Weight as of this encounter: 59.5 kg (131 lb 2.8 oz).              Disposition Plan     Medically Ready for Discharge: Anticipated Tomorrow         Discharge barrier(s): IV steroid  Care discussed with: patient, RN      Patricia Gimenez MD  Hospitalist Service  [facility]  Securely message with Med Access (more info)  Text page via [employer] Paging/Directory   ______________________________________________________________________      Physical Exam   Vital Signs: Temp: 97.7  F (36.5  C) Temp src: Oral BP: 126/74 Pulse: 84   Resp: 18 SpO2: 94 % O2 Device: None (Room air)    Weight: 131 lbs 2.78 oz    General: in no apparent distress, non-toxic, and alert female lying in hospital bed oriented x3  HEENT: Head normocephalic atraumatic, oral mucosa moist. Sclerae anicteric  CV: Regular rhythm, normal rate, no murmurs  Resp: No wheezes, no rales or rhonchi, no focal consolidations  GI: Belly soft, nondistended, nontender, bowel sounds present  Skin: No rashes or lesions  Extremities: No peripheral edema  Psych: Normal affect, mood dysthymic  Neuro: Grossly normal      Medical Decision Making               Data   Recent Results (from the past 16 hours)   Comprehensive metabolic panel    Collection Time: 07/02/25  5:06 AM   Result Value Ref Range    Sodium 141 135 - 145 mmol/L    Potassium 3.8 3.4 - 5.3 mmol/L    Carbon Dioxide (CO2) 26 22 - 29 mmol/L    Anion Gap 8 7 - 15 mmol/L    Urea Nitrogen 7.7 6.0 - 20.0 mg/dL    Creatinine 0.58 0.51 - 0.95 mg/dL    GFR Estimate >90 >60 mL/min/1.73m2    Calcium 8.9 8.8 - 10.4 mg/dL    Chloride 107 98 - 107 mmol/L    Glucose 154 (H) 70 - 99 mg/dL    Alkaline Phosphatase 91 40 - 150 U/L    AST 12 0 - 45 U/L    ALT 11 0 - 50 U/L    Protein Total 7.6 6.4 - 8.3 g/dL    Albumin 4.0 3.5 - 5.2 g/dL    " Bilirubin Total 1.0 <=1.2 mg/dL   Magnesium    Collection Time: 07/02/25  5:06 AM   Result Value Ref Range    Magnesium 2.2 1.7 - 2.3 mg/dL   Phosphorus    Collection Time: 07/02/25  5:06 AM   Result Value Ref Range    Phosphorus 2.4 (L) 2.5 - 4.5 mg/dL   CBC with platelets and differential    Collection Time: 07/02/25  5:06 AM   Result Value Ref Range    WBC Count 6.6 4.0 - 11.0 10e3/uL    RBC Count 4.93 3.80 - 5.20 10e6/uL    Hemoglobin 13.7 11.7 - 15.7 g/dL    Hematocrit 41.5 35.0 - 47.0 %    MCV 84 78 - 100 fL    MCH 27.8 26.5 - 33.0 pg    MCHC 33.0 31.5 - 36.5 g/dL    RDW 12.8 10.0 - 15.0 %    Platelet Count 211 150 - 450 10e3/uL    % Neutrophils 88 %    % Lymphocytes 10 %    % Monocytes 1 %    % Eosinophils 0 %    % Basophils 0 %    % Immature Granulocytes 1 %    NRBCs per 100 WBC 0 <1 /100    Absolute Neutrophils 5.8 1.6 - 8.3 10e3/uL    Absolute Lymphocytes 0.7 (L) 0.8 - 5.3 10e3/uL    Absolute Monocytes 0.1 0.0 - 1.3 10e3/uL    Absolute Eosinophils 0.0 0.0 - 0.7 10e3/uL    Absolute Basophils 0.0 0.0 - 0.2 10e3/uL    Absolute Immature Granulocytes 0.0 <=0.4 10e3/uL    Absolute NRBCs 0.0 10e3/uL   Prepare pheresed platelets (unit)    Collection Time: 07/02/25  7:39 AM   Result Value Ref Range    Blood Component Type Platelets     Product Code [id number]     Unit Status Transfused     Unit Number [id number]     CODING SYSTEM [id number]     ISSUE DATE AND TIME 7/2/2025  8:19:00 AM CDT     UNIT ABO/RH B+     UNIT TYPE ISBT 7300    Prepare pheresed platelets (unit)    Collection Time: 07/02/25  7:39 AM   Result Value Ref Range    Blood Component Type Platelets     Product Code [id number]     Unit Status Returned     Unit Number [id number]     CODING SYSTEM [id number]     ISSUE DATE AND TIME 7/2/2025  8:19:00 AM CDT     UNIT ABO/RH A+     UNIT TYPE ISBT 6200        Interval History   Patient states doing OK. Does complain of some abdominal pain, worse when she takes a deep breath. Nurse is going to bring her some pain  medicine. Discussed plan of care for her ITP including IV steroids and evaluation by hematology, patient is OK with this. Likely at least one to two more days in the hospital depending on Platelet trend.

## 2025-07-02 NOTE — CONSULTS
[facility] Hematology and Oncology Inpatient Consult Note    Patient: Ellie Cooper  MRN: [id number]  Date of Service: 7/2/2025      Reason for Visit    I was consulted by Dr. Donnelly  regarding thrombocytopenia      ECOG Performance Status: 0          ______________________________________________________________________________            History Of Present Illness  Ms. 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  She was admitted yesterday with an acute uncomplicated appendicitis and on presentation had a platelet count of 37,000 which is dropped down to 31,000.  Reviewing her labs over the last  4 years her platelet count has ranged between 17,000 and 102,000.  She has seen hematology many years ago but does not see a hematologist on a regular basis.  She was given a platelet transfusion and had a significant bump in her platelets and underwent appendectomy without any bleeding complications.  Hematology has been asked to see the patient because of her ITP        Review of systems.  Apart from describing in history, the remainder of comprehensive ROS was negative.      Past History  History reviewed. No pertinent past medical history.  Past Surgical History:   Procedure Laterality Date    NO PAST SURGERIES       Family History   Problem Relation Age of Onset    No Known Problems Mother     No Known Problems Father     Diabetes Sister     No Known Problems Brother     No Known Problems Brother     No Known Problems Brother     No Known Problems Brother      Social History     Socioeconomic History    Marital status:      Spouse name: None    Number of children: None    Years of education: None    Highest education level: None   Tobacco Use    Smoking status: Never     Passive exposure: Current (son smokes outside)    Smokeless tobacco: Never   Substance and Sexual Activity    Alcohol use: No  "   Drug use: No    Sexual activity: Yes     Partners: Male     Social Drivers of Health     Financial Resource Strain: Low Risk  (7/1/2025)    Financial Resource Strain     Within the past 12 months, have you or your family members you live with been unable to get utilities (heat, electricity) when it was really needed?: No   Food Insecurity: Low Risk  (7/1/2025)    Food Insecurity     Within the past 12 months, did you worry that your food would run out before you got money to buy more?: No     Within the past 12 months, did the food you bought just not last and you didn t have money to get more?: No   Transportation Needs: Low Risk  (7/1/2025)    Transportation Needs     Within the past 12 months, has lack of transportation kept you from medical appointments, getting your medicines, non-medical meetings or appointments, work, or from getting things that you need?: No   Interpersonal Safety: Low Risk  (7/2/2025)    Interpersonal Safety     Do you feel physically and emotionally safe where you currently live?: Yes     Within the past 12 months, have you been hit, slapped, kicked or otherwise physically hurt by someone?: No     Within the past 12 months, have you been humiliated or emotionally abused in other ways by your partner or ex-partner?: No   Housing Stability: Low Risk  (7/1/2025)    Housing Stability     Do you have housing? : Yes     Are you worried about losing your housing?: No       Allergies    No Known Allergies       Physical Exam    /74 (BP Location: Left arm)   Pulse 84   Temp 97.7  F (36.5  C) (Oral)   Resp 18   Ht 1.499 m (4' 11\")   Wt 59.5 kg (131 lb 2.8 oz)   LMP 05/25/2025 (Exact Date)   SpO2 94%   BMI 26.49 kg/m        General: alert, awake, not in acute distress  HEENT: Head: Normal, normocephalic, atraumatic.  Eye: Normal external eye, conjunctiva, lids cornea, ROSELINE.  Nose: Normal external nose, mucus membranes and septum.  Pharynx: Normal buccal mucosa. Normal pharynx.  Neck " / Thyroid: Supple, no masses, nodes, nodules or enlargement.  Lymphatics: No abnormally enlarged lymph nodes.  Chest: Normal chest wall and respirations. Clear to auscultation.  Heart: S1 S2 RRR, no murmur.   Abdomen: abdomen is soft without significant tenderness, masses, organomegaly or guarding  Extremities: normal strength, tone, and muscle mass  Skin: normal. no rash or abnormalities  CNS: non focal.      Lab Results  Recent Results (from the past 24 hours)   Comprehensive metabolic panel    Collection Time: 07/01/25  3:19 PM   Result Value Ref Range    Sodium 141 135 - 145 mmol/L    Potassium 4.7 3.4 - 5.3 mmol/L    Chloride 107 94 - 109 mmol/L    Carbon Dioxide (CO2) 28 20 - 32 mmol/L    Anion Gap 6 3 - 14 mmol/L    Urea Nitrogen 9 7 - 30 mg/dL    Creatinine 0.50 (L) 0.52 - 1.04 mg/dL    GFR Estimate >90 >60 mL/min/1.73m2    Calcium 9.1 8.5 - 10.1 mg/dL    Glucose 94 70 - 99 mg/dL    Alkaline Phosphatase 76 40 - 150 U/L    AST 24 0 - 45 U/L    ALT 16 0 - 50 U/L    Protein Total 7.7 6.8 - 8.8 g/dL    Albumin 4.0 3.4 - 5.0 g/dL    Bilirubin Total 0.8 0.2 - 1.3 mg/dL   Lipase    Collection Time: 07/01/25  3:19 PM   Result Value Ref Range    Lipase 17 13 - 60 U/L   CRP inflammation    Collection Time: 07/01/25  3:19 PM   Result Value Ref Range    CRP Inflammation 4.00 <5.00 mg/L   CBC with platelets and differential    Collection Time: 07/01/25  3:19 PM   Result Value Ref Range    WBC Count 16.3 (H) 4.0 - 11.0 10e3/uL    RBC Count 5.48 (H) 3.80 - 5.20 10e6/uL    Hemoglobin 15.0 11.7 - 15.7 g/dL    Hematocrit 46.4 35.0 - 47.0 %    MCV 85 78 - 100 fL    MCH 27.4 26.5 - 33.0 pg    MCHC 32.3 31.5 - 36.5 g/dL    RDW 12.8 10.0 - 15.0 %    Platelet Count 37 (LL) 150 - 450 10e3/uL    % Neutrophils 79 %    % Lymphocytes 15 %    % Monocytes 6 %    % Eosinophils 1 %    % Basophils 1 %    % Immature Granulocytes 0 %    NRBCs per 100 WBC 0 <1 /100    Absolute Neutrophils 12.8 (H) 1.6 - 8.3 10e3/uL    Absolute Lymphocytes 2.4  0.8 - 5.3 10e3/uL    Absolute Monocytes 0.9 0.0 - 1.3 10e3/uL    Absolute Eosinophils 0.1 0.0 - 0.7 10e3/uL    Absolute Basophils 0.1 0.0 - 0.2 10e3/uL    Absolute Immature Granulocytes 0.1 <=0.4 10e3/uL    Absolute NRBCs 0.0 10e3/uL   RBC and Platelet Morphology    Collection Time: 07/01/25  3:19 PM   Result Value Ref Range    RBC Morphology Confirmed RBC Indices     Platelet Assessment  Automated Count Confirmed. Platelet morphology is normal.     Automated Count Confirmed. Platelet morphology is normal.   HCG qualitative urine    Collection Time: 07/01/25  3:29 PM   Result Value Ref Range    hCG Urine Qualitative Negative Negative   UA with Microscopic reflex to Culture    Collection Time: 07/01/25  4:00 PM    Specimen: Urine, NOS   Result Value Ref Range    Color Urine Yellow Colorless, Straw, Light Yellow, Yellow    Appearance Urine Clear Clear    Glucose Urine Negative Negative mg/dL    Bilirubin Urine Negative Negative    Ketones Urine Negative Negative mg/dL    Specific Gravity Urine <=1.005 1.005 - 1.030    Blood Urine Trace (A) Negative    pH Urine 6.0 5.0 - 8.0    Protein Albumin Urine Negative Negative mg/dL    Urobilinogen Urine 0.2 0.2, 1.0 E.U./dL    Nitrite Urine Negative Negative    Leukocyte Esterase Urine Negative Negative   UA Microscopic with Reflex to Culture    Collection Time: 07/01/25  4:00 PM   Result Value Ref Range    Bacteria Urine Few (A) None Seen /HPF    RBC Urine 0-2 0-2 /HPF /HPF    WBC Urine None Seen 0-5 /HPF /HPF   Blood Culture Peripheral blood (BC) Arm, Left    Collection Time: 07/01/25  6:05 PM    Specimen: Arm, Left; Peripheral blood (BC)   Result Value Ref Range    Culture No growth after 12 hours    CBC with platelets and differential    Collection Time: 07/01/25  6:05 PM   Result Value Ref Range    WBC Count 15.2 (H) 4.0 - 11.0 10e3/uL    RBC Count 5.14 3.80 - 5.20 10e6/uL    Hemoglobin 13.7 11.7 - 15.7 g/dL    Hematocrit 43.1 35.0 - 47.0 %    MCV 84 78 - 100 fL    MCH  26.7 26.5 - 33.0 pg    MCHC 31.8 31.5 - 36.5 g/dL    RDW 12.6 10.0 - 15.0 %    Platelet Count 31 (LL) 150 - 450 10e3/uL    % Neutrophils 81 %    % Lymphocytes 13 %    % Monocytes 4 %    % Eosinophils 0 %    % Basophils 0 %    % Immature Granulocytes 1 %    NRBCs per 100 WBC 0 <1 /100    Absolute Neutrophils 12.3 (H) 1.6 - 8.3 10e3/uL    Absolute Lymphocytes 2.0 0.8 - 5.3 10e3/uL    Absolute Monocytes 0.6 0.0 - 1.3 10e3/uL    Absolute Eosinophils 0.0 0.0 - 0.7 10e3/uL    Absolute Basophils 0.0 0.0 - 0.2 10e3/uL    Absolute Immature Granulocytes 0.1 <=0.4 10e3/uL    Absolute NRBCs 0.0 10e3/uL   Blood Culture Peripheral blood (BC) Hand, Right    Collection Time: 07/01/25  6:07 PM    Specimen: Hand, Right; Peripheral blood (BC)   Result Value Ref Range    Culture No growth after 12 hours    Adult Type and Screen    Collection Time: 07/01/25  7:21 PM   Result Value Ref Range    ABO/RH(D) O POS     Antibody Screen Negative Negative    SPECIMEN EXPIRATION DATE 7/4/2025 11:59:00 PM CDT    Prepare pheresed platelets (unit)    Collection Time: 07/01/25  7:28 PM   Result Value Ref Range    Blood Component Type Platelets     Product Code Z6571Q05     Unit Status Transfused     Unit Number [id number]     CODING SYSTEM [id number]     ISSUE DATE AND TIME 7/2/2025 12:35:00 AM CDT     UNIT ABO/RH A+     UNIT TYPE ISBT 6200    Prepare pheresed platelets (unit)    Collection Time: 07/01/25  7:28 PM   Result Value Ref Range    Blood Component Type Platelets     Product Code X3359N56     Unit Status Transfused     Unit Number [id number]     CODING SYSTEM [id number]     ISSUE DATE AND TIME 7/1/2025  9:40:00 PM CDT     UNIT ABO/RH O+     UNIT TYPE ISBT 5100    Lactic Acid Whole Blood with 1X Repeat in 2 HR when >2    Collection Time: 07/01/25  7:46 PM   Result Value Ref Range    Lactic Acid, Initial 1.0 0.7 - 2.0 mmol/L   Extra Green Top Tube (LAB USE ONLY)    Collection Time: 07/01/25  7:46 PM   Result Value Ref Range    Hold Specimen  [facility]    Bld morphology pathology review    Collection Time: 07/01/25  7:46 PM   Result Value Ref Range    Final Diagnosis       PERIPHERAL BLOOD SMEAR MORPHOLOGY:        1.  MARKED THROMBOCYTOPENIA; CONSISTENT WITH CLINICAL HISTORY OF PROBABLE IDIOPATHIC              THROMBOCYTOPENIA PURPURA (ITP)        2.  MILD ABSOLUTE NEUTROPHILIA, CONSISTENT WITH AN ACUTE PHASE INFLAMMATORY REACTION        3.  NONANEMIC PERIPHERAL BLOOD.  UNREMARKABLE ERYTHROCYTE MORPHOLOGY        Comment       Thrombocytopenia may be secondary to decreased bone marrow production of platelets, or increased peripheral destruction such as an hypersplenism or DIC or ITP.  The chronicity of the thrombocytopenia is noted (since 2021).      Clinical Information       Acute appendicitis.  ITP      Peripheral Smear       Erythrocytes are normal in number, normochromic and normocytic.  Increased anisopoikilocytosis, increased polychromasia, basophilic stippling and nucleated red blood cells are not identified.     Leukocytes are normal in number and show a mild absolute neutrophilia (10.5 K/uL)., without a neutrophilic left shift.  Megaloblastic changes, dysplastic features and blasts are not identified.  Lymphocytes are mature and polymorphic in appearance.    Platelets are markedly decreased in number and normal in morphology.  Platelet clumps are not identified.      Performing Labs       The technical component of this testing was completed at the [facility] and [facility]      CBC with platelets and differential    Collection Time: 07/01/25  7:46 PM   Result Value Ref Range    WBC Count 13.7 (H) 4.0 - 11.0 10e3/uL    RBC Count 5.15 3.80 - 5.20 10e6/uL    Hemoglobin 13.7 11.7 - 15.7 g/dL    Hematocrit 42.8 35.0 - 47.0 %    MCV 83 78 - 100 fL    MCH 26.6 26.5 - 33.0 pg    MCHC 32.0 31.5 - 36.5 g/dL    RDW 12.6 10.0 - 15.0 %    Platelet Count 28 (LL) 150 - 450 10e3/uL    % Neutrophils 76 %    %  Lymphocytes 18 %    % Monocytes 5 %    % Eosinophils 0 %    % Basophils 0 %    % Immature Granulocytes 1 %    NRBCs per 100 WBC 0 <1 /100    Absolute Neutrophils 10.5 (H) 1.6 - 8.3 10e3/uL    Absolute Lymphocytes 2.5 0.8 - 5.3 10e3/uL    Absolute Monocytes 0.6 0.0 - 1.3 10e3/uL    Absolute Eosinophils 0.0 0.0 - 0.7 10e3/uL    Absolute Basophils 0.0 0.0 - 0.2 10e3/uL    Absolute Immature Granulocytes 0.1 <=0.4 10e3/uL    Absolute NRBCs 0.0 10e3/uL   Reticulocyte count    Collection Time: 07/01/25  7:46 PM   Result Value Ref Range    % Reticulocyte 1.1 0.5 - 2.0 %    Absolute Reticulocyte 0.053 0.025 - 0.095 10e6/uL   Comprehensive metabolic panel    Collection Time: 07/02/25  5:06 AM   Result Value Ref Range    Sodium 141 135 - 145 mmol/L    Potassium 3.8 3.4 - 5.3 mmol/L    Carbon Dioxide (CO2) 26 22 - 29 mmol/L    Anion Gap 8 7 - 15 mmol/L    Urea Nitrogen 7.7 6.0 - 20.0 mg/dL    Creatinine 0.58 0.51 - 0.95 mg/dL    GFR Estimate >90 >60 mL/min/1.73m2    Calcium 8.9 8.8 - 10.4 mg/dL    Chloride 107 98 - 107 mmol/L    Glucose 154 (H) 70 - 99 mg/dL    Alkaline Phosphatase 91 40 - 150 U/L    AST 12 0 - 45 U/L    ALT 11 0 - 50 U/L    Protein Total 7.6 6.4 - 8.3 g/dL    Albumin 4.0 3.5 - 5.2 g/dL    Bilirubin Total 1.0 <=1.2 mg/dL   Magnesium    Collection Time: 07/02/25  5:06 AM   Result Value Ref Range    Magnesium 2.2 1.7 - 2.3 mg/dL   Phosphorus    Collection Time: 07/02/25  5:06 AM   Result Value Ref Range    Phosphorus 2.4 (L) 2.5 - 4.5 mg/dL   CBC with platelets and differential    Collection Time: 07/02/25  5:06 AM   Result Value Ref Range    WBC Count 6.6 4.0 - 11.0 10e3/uL    RBC Count 4.93 3.80 - 5.20 10e6/uL    Hemoglobin 13.7 11.7 - 15.7 g/dL    Hematocrit 41.5 35.0 - 47.0 %    MCV 84 78 - 100 fL    MCH 27.8 26.5 - 33.0 pg    MCHC 33.0 31.5 - 36.5 g/dL    RDW 12.8 10.0 - 15.0 %    Platelet Count 211 150 - 450 10e3/uL    % Neutrophils 88 %    % Lymphocytes 10 %    % Monocytes 1 %    % Eosinophils 0 %    %  Basophils 0 %    % Immature Granulocytes 1 %    NRBCs per 100 WBC 0 <1 /100    Absolute Neutrophils 5.8 1.6 - 8.3 10e3/uL    Absolute Lymphocytes 0.7 (L) 0.8 - 5.3 10e3/uL    Absolute Monocytes 0.1 0.0 - 1.3 10e3/uL    Absolute Eosinophils 0.0 0.0 - 0.7 10e3/uL    Absolute Basophils 0.0 0.0 - 0.2 10e3/uL    Absolute Immature Granulocytes 0.0 <=0.4 10e3/uL    Absolute NRBCs 0.0 10e3/uL   Hemoglobin A1c    Collection Time: 07/02/25  5:06 AM   Result Value Ref Range    Estimated Average Glucose 108 <117 mg/dL    Hemoglobin A1C 5.4 <5.7 %   Prepare pheresed platelets (unit)    Collection Time: 07/02/25  7:39 AM   Result Value Ref Range    Blood Component Type Platelets     Product Code Q9261X74     Unit Status Transfused     Unit Number [id number]     CODING SYSTEM [id number]     ISSUE DATE AND TIME 7/2/2025  8:19:00 AM CDT     UNIT ABO/RH B+     UNIT TYPE ISBT 7300    Prepare pheresed platelets (unit)    Collection Time: 07/02/25  7:39 AM   Result Value Ref Range    Blood Component Type Platelets     Product Code N0967L21     Unit Status Returned     Unit Number [id number]     CODING SYSTEM [id number]     ISSUE DATE AND TIME 7/2/2025  8:19:00 AM CDT     UNIT ABO/RH A+     UNIT TYPE ISBT 6200         Imaging Results    CT Abdomen Pelvis w Contrast  Result Date: 7/1/2025  EXAM: CT ABDOMEN PELVIS W CONTRAST LOCATION: [facility] DATE: 7/1/2025 INDICATION:  RLQ abdominal pain COMPARISON: None. TECHNIQUE: CT scan of the abdomen and pelvis was performed following injection of IV contrast. Multiplanar reformats were obtained. Dose reduction techniques were used. CONTRAST: Isovue 370, 64 mL FINDINGS: LOWER CHEST: Normal. HEPATOBILIARY: Normal. PANCREAS: Normal. SPLEEN: Normal. ADRENAL GLANDS: Normal. KIDNEYS/BLADDER: Normal. BOWEL: The appendix is dilated measuring up to 11 mm in diameter and has mucosal hyperenhancement particularly towards the base of the cecum. There is periappendiceal inflammatory  stranding but no right lower quadrant fluid collection or free air. Stomach and small bowel is normal. Colonic stool burden is normal. LYMPH NODES: Normal. VASCULATURE: Normal. PELVIC ORGANS: Retroflexed uterus. Small amount of free fluid in the pelvis. Physiologic follicles in the ovaries which are not enlarged. MUSCULOSKELETAL: Normal.     IMPRESSION: Acute, uncomplicated appendicitis.       Assessment and Plan  Thrombocytopenia probable ITP  Patient has thrombocytopenia that extends for at least 4 years.  The operating diagnosis has been ITP.  She has had her appendectomy without any evidence of bleeding.  Her platelet count was 211,000 earlier this morning.  No intervention is needed in this hospitalization however patient should get follow-up with hematology on discharge to be seen in 4 to 6 weeks so she can be monitored.  This was discussed with the patient and her significant other.  Their questions were answered.  1.  No intervention by hematology in this admission if no evidence of bleeding  2.  Patient to be set up with hematology in 4 to 6 weeks after discharge    Signed by: Mary Jo Gregg MD

## 2025-07-02 NOTE — OP NOTE
Operative Note    Name:  Ellie Cooper  PCP:  Junior Laithbhavya  Procedure Date:  7/2/2025       Procedure:  Procedure(s):  APPENDECTOMY, LAPAROSCOPIC     Pre-Procedure Diagnosis:  Acute appendicitis, unspecified acute appendicitis type [K35.80]  Thrombocytopenia, unspecified [D69.6]     Post-Procedure Diagnosis:    Same    Surgeon(s):  Alexandra Appiah MD     Assistant: None      Anesthesia Type:  General       Findings:  Early, acute appendicitis.  No signs of bleeding at all through surgery.    Operative Report:    The patient was properly identified and brought to the operating suite where they were placed in the supine position, general anesthetic was administered and prepped and draped in a sterile fashion.  A small incision was made below the umbilicus and a Veress needle passed into the abdominal cavity which was insufflated with carbon dioxide.  A 5mm trocar port was then placed followed by the camera.  Under vision I placed another 5 mm port just above the first 1 and then may 12 mm port in the lower midline.  With the patient in extreme Trendelenburg is able to visualize the appendix.  The mesoappendix was taken down with the LigaSure device and the base of the appendix amputated with the Endo LESLIE stapler.  The appendix was pulled up into the larger port and removed easily without causing any contamination.  The port was replaced and the area irrigated until clear.  There was absolute hemostasis.  There was no oozing at all throughout the case.  Was removed and each site was then closed with subcuticular stitches of 4 Monocryl.  Dressings were placed.  The patient tolerated the procedure well.    Estimated Blood Loss:   5cc     Specimens:    ID Type Source Tests Collected by Time Destination   1 : Appendix Tissue Appendix SURGICAL PATHOLOGY EXAM Alexandra Appiah MD 7/2/2025  8:13 AM            Drains:        Complications:    None    Alexandra Appiah MD     Date: 7/2/2025  Time: 8:44 AM

## 2025-07-02 NOTE — ANESTHESIA PREPROCEDURE EVALUATION
Anesthesia Pre-Procedure Evaluation    Patient: Ellie Cooper   MRN: [id number] : 1985          Procedure : Procedure(s):  APPENDECTOMY, LAPAROSCOPIC         History reviewed. No pertinent past medical history.   Past Surgical History:   Procedure Laterality Date    NO PAST SURGERIES        No Known Allergies   Social History     Tobacco Use    Smoking status: Never     Passive exposure: Current (son smokes outside)    Smokeless tobacco: Never   Substance Use Topics    Alcohol use: No      Wt Readings from Last 1 Encounters:   25 59.5 kg (131 lb 2.8 oz)        Anesthesia Evaluation        History of anesthetic complications  - motion sickness.      ROS/MED HX  ENT/Pulmonary:       Neurologic:       Cardiovascular:       METS/Exercise Tolerance:     Hematologic: Comments: thrombocytopenia      Musculoskeletal:       GI/Hepatic:       Renal/Genitourinary:       Endo:       Psychiatric/Substance Use:       Infectious Disease:       Malignancy:       Other:              Physical Exam  Airway  Mallampati: I  TM distance: >3 FB  Neck ROM: full  Mouth opening: >= 4 cm    Cardiovascular   Rhythm: regular     Dental     Pulmonary Breath sounds clear to auscultation        Neurological   She appears awake.    Other Findings       OUTSIDE LABS:  CBC:   Lab Results   Component Value Date    WBC 6.6 2025    WBC 13.7 (H) 2025    HGB 13.7 2025    HGB 13.7 2025    HCT 41.5 2025    HCT 42.8 2025     2025    PLT 28 (LL) 2025     BMP:   Lab Results   Component Value Date     2025     2025    POTASSIUM 3.8 2025    POTASSIUM 4.7 2025    CHLORIDE 107 2025    CHLORIDE 107 2025    CO2 26 2025    CO2 28 2025    BUN 7.7 2025    BUN 9 2025    CR 0.58 2025    CR 0.50 (L) 2025     (H) 2025    GLC 94 2025     COAGS:   Lab Results   Component Value Date    PTT 29 2021  "   INR 0.91 12/09/2021     POC:   Lab Results   Component Value Date    HCG Negative 07/01/2025     HEPATIC:   Lab Results   Component Value Date    ALBUMIN 4.0 07/02/2025    PROTTOTAL 7.6 07/02/2025    ALT 11 07/02/2025    AST 12 07/02/2025    ALKPHOS 91 07/02/2025    BILITOTAL 1.0 07/02/2025     OTHER:   Lab Results   Component Value Date    LACT 1.0 07/01/2025    BASIM 8.9 07/02/2025    PHOS 2.4 (L) 07/02/2025    MAG 2.2 07/02/2025    LIPASE 17 07/01/2025    TSH 2.05 10/15/2021       Anesthesia Plan    ASA Status:  2       Anesthesia Type: General.  Maintenance: TIVA.        Consents            - Pt is DNR/DNI Status: no DNR          Postoperative Care         Comments:    Other Comments: Plts available and ready as needed. Pt received 2u this am.               Francisco Roman MD    I have reviewed the pertinent notes and labs in the chart from the past 30 days and (re)examined the patient.  Any updates or changes from those notes are reflected in this note.    Clinically Significant Risk Factors Present on Admission                 # Thrombocytopenia: Lowest platelets = 28 in last 2 days, will monitor for bleeding             # Overweight: Estimated body mass index is 26.49 kg/m  as calculated from the following:    Height as of this encounter: 1.499 m (4' 11\").    Weight as of this encounter: 59.5 kg (131 lb 2.8 oz).                    "

## 2025-07-02 NOTE — ANESTHESIA PROCEDURE NOTES
Airway       Patient location during procedure: OR       Procedure Start/Stop Times: 7/2/2025 7:42 AM  Staff -        CRNA: Augie Dorman APRN CRNA       Performed By: CRNA  Consent for Airway        Urgency: elective  Indications and Patient Condition       Indications for airway management: miguel angel-procedural       Induction type:RSI       Mask difficulty assessment: 0 - not attempted    Final Airway Details       Final airway type: endotracheal airway       Successful airway: ETT - single and Oral  Endotracheal Airway Details        ETT size (mm): 7.0       Cuffed: yes       Successful intubation technique: direct laryngoscopy       DL Blade Type: MAC 3       Grade View of Cords: 1       Position: Right       Measured from: lips       Secured at (cm): 22       Bite block used: None    Post intubation assessment        Placement verified by: capnometry, equal breath sounds and chest rise        Number of attempts at approach: 1       Number of other approaches attempted: 0       Secured with: tape and commercial tube decker       Ease of procedure: easy       Dentition: Intact and Unchanged       Dental guard used and removed. Dental Guard Type: Standard White.    Medication(s) Administered   Medication Administration Time: 7/2/2025 7:42 AM

## 2025-07-02 NOTE — PLAN OF CARE
Goal Outcome Evaluation:      Plan of Care Reviewed With: patient  Pt A&Ox4. Pt c/o abdominal pain, IV Dilaudid and oral Norco given. Incisions clean, dry, and intact. Pt able to void 400mL, post void bladder scan 0mL. Pt sba. Pt discharging to home. Family to transport. All belongings sent home with pt.

## 2025-07-03 LAB
BACTERIA SPEC CULT: NORMAL
BACTERIA SPEC CULT: NORMAL

## 2025-07-06 LAB
BACTERIA SPEC CULT: NO GROWTH
BACTERIA SPEC CULT: NO GROWTH

## 2025-07-07 ENCOUNTER — APPOINTMENT (OUTPATIENT)
Dept: INTERPRETER SERVICES | Facility: CLINIC | Age: 40
End: 2025-07-07
Payer: COMMERCIAL

## 2025-07-07 ENCOUNTER — TELEPHONE (OUTPATIENT)
Dept: SURGERY | Facility: CLINIC | Age: 40
End: 2025-07-07
Payer: COMMERCIAL

## 2025-07-07 DIAGNOSIS — R11.0 POSTOPERATIVE NAUSEA: Primary | ICD-10-CM

## 2025-07-07 DIAGNOSIS — Z98.890 POSTOPERATIVE NAUSEA: Primary | ICD-10-CM

## 2025-07-07 RX ORDER — ONDANSETRON 4 MG/1
4 TABLET, ORALLY DISINTEGRATING ORAL EVERY 8 HOURS PRN
Qty: 6 TABLET | Refills: 0 | Status: SHIPPED | OUTPATIENT
Start: 2025-07-07

## 2025-07-07 NOTE — TELEPHONE ENCOUNTER
[facility] Post-Op Phone Call                     Surgeon: Alexandra Appiah     Date of Surgery: 07/02/25  Surgery: Laparoscopic Appendectomy   Discharge Date: 07/02/25    Date/Time Called:   Date: 7/7/2025 Time: 12:11 PM   Attempt: First    Pain Control:  Intensity: No Pain (0)  Duration/Location/Explain:   What makes it better/worse?     Medications:  Narcotic Use - No  Drug type:   Frequency:     Incisions:  Drainage? clean and dry  Any fever type symptoms? No  Comment:     GI:  Nausea? Yes- prescribing zofran   Vomiting? No  BM? Yes  Gas? Yes   Voiding Frequency? 4 or more/day   Appetite? Fair    Activity:  Walking activity? Yes  Frequency/Type: as tolerated   Restrictions: return to normal activity as tolerated     Return to Work Plans?  Expected date 7/14/25  Do you need anything from us in this regard? RTW letter     Post-op appointment made? No          Thank you for your time. Please do not hesitate to call us with any questions or concerns.    Call completed by: Kylie Wright RN

## 2025-07-07 NOTE — LETTER
July 7, 2025      Ellie Cooper  [address]  [place] [address]        To Whom It May Concern:    Ellie Cooper had a recent surgery on 07/01/25. Please excuse her from work until 07/14/25 to allow proper healing.She may return to work on 07/14/25 without restrictions. Please reach out with any questions or concerns.     Sincerely,        Alexandra Appiah MD    Electronically signed

## 2025-07-11 LAB
PATH REPORT.COMMENTS IMP SPEC: NORMAL
PATH REPORT.COMMENTS IMP SPEC: NORMAL
PATH REPORT.FINAL DX SPEC: NORMAL
PATH REPORT.GROSS SPEC: NORMAL
PATH REPORT.MICROSCOPIC SPEC OTHER STN: NORMAL
PATH REPORT.RELEVANT HX SPEC: NORMAL
PHOTO IMAGE: NORMAL

## 2025-08-08 ENCOUNTER — OFFICE VISIT (OUTPATIENT)
Dept: FAMILY MEDICINE | Facility: CLINIC | Age: 40
End: 2025-08-08
Payer: COMMERCIAL

## 2025-08-08 ENCOUNTER — RESULTS FOLLOW-UP (OUTPATIENT)
Dept: FAMILY MEDICINE | Facility: CLINIC | Age: 40
End: 2025-08-08

## 2025-08-08 VITALS
HEART RATE: 80 BPM | DIASTOLIC BLOOD PRESSURE: 80 MMHG | OXYGEN SATURATION: 98 % | TEMPERATURE: 98.1 F | WEIGHT: 130.4 LBS | SYSTOLIC BLOOD PRESSURE: 108 MMHG | HEIGHT: 59 IN | RESPIRATION RATE: 16 BRPM | BODY MASS INDEX: 26.29 KG/M2

## 2025-08-08 DIAGNOSIS — Z12.4 ENCOUNTER FOR SCREENING FOR CERVICAL CANCER: ICD-10-CM

## 2025-08-08 DIAGNOSIS — D69.6 THROMBOCYTOPENIA, UNSPECIFIED: ICD-10-CM

## 2025-08-08 DIAGNOSIS — Z11.3 SCREEN FOR STD (SEXUALLY TRANSMITTED DISEASE): ICD-10-CM

## 2025-08-08 DIAGNOSIS — J30.1 SEASONAL ALLERGIC RHINITIS DUE TO POLLEN: Primary | ICD-10-CM

## 2025-08-08 DIAGNOSIS — Z12.31 VISIT FOR SCREENING MAMMOGRAM: ICD-10-CM

## 2025-08-08 LAB
C TRACH DNA SPEC QL PROBE+SIG AMP: NEGATIVE
CLUE CELLS: NORMAL
N GONORRHOEA DNA SPEC QL NAA+PROBE: NEGATIVE
SPECIMEN TYPE: NORMAL
TRICHOMONAS, WET PREP: NORMAL
WBC'S/HIGH POWER FIELD, WET PREP: NORMAL
YEAST, WET PREP: NORMAL

## 2025-08-08 PROCEDURE — 3074F SYST BP LT 130 MM HG: CPT | Performed by: FAMILY MEDICINE

## 2025-08-08 PROCEDURE — 87491 CHLMYD TRACH DNA AMP PROBE: CPT | Performed by: FAMILY MEDICINE

## 2025-08-08 PROCEDURE — 99214 OFFICE O/P EST MOD 30 MIN: CPT | Mod: 25 | Performed by: FAMILY MEDICINE

## 2025-08-08 PROCEDURE — 90715 TDAP VACCINE 7 YRS/> IM: CPT | Performed by: FAMILY MEDICINE

## 2025-08-08 PROCEDURE — 87591 N.GONORRHOEAE DNA AMP PROB: CPT | Performed by: FAMILY MEDICINE

## 2025-08-08 PROCEDURE — G0145 SCR C/V CYTO,THINLAYER,RESCR: HCPCS | Performed by: FAMILY MEDICINE

## 2025-08-08 PROCEDURE — 87210 SMEAR WET MOUNT SALINE/INK: CPT | Performed by: FAMILY MEDICINE

## 2025-08-08 PROCEDURE — 90471 IMMUNIZATION ADMIN: CPT | Performed by: FAMILY MEDICINE

## 2025-08-08 PROCEDURE — 87624 HPV HI-RISK TYP POOLED RSLT: CPT | Performed by: FAMILY MEDICINE

## 2025-08-08 PROCEDURE — G2211 COMPLEX E/M VISIT ADD ON: HCPCS | Performed by: FAMILY MEDICINE

## 2025-08-08 PROCEDURE — 3079F DIAST BP 80-89 MM HG: CPT | Performed by: FAMILY MEDICINE

## 2025-08-08 RX ORDER — CETIRIZINE HYDROCHLORIDE 10 MG/1
10 TABLET ORAL DAILY
Qty: 90 TABLET | Refills: 3 | Status: SHIPPED | OUTPATIENT
Start: 2025-08-08

## 2025-08-08 RX ORDER — FLUTICASONE PROPIONATE 50 MCG
1 SPRAY, SUSPENSION (ML) NASAL DAILY
Qty: 16 G | Refills: 3 | Status: SHIPPED | OUTPATIENT
Start: 2025-08-08

## 2025-08-11 ENCOUNTER — PATIENT OUTREACH (OUTPATIENT)
Dept: CARE COORDINATION | Facility: CLINIC | Age: 40
End: 2025-08-11
Payer: COMMERCIAL

## 2025-08-11 LAB
HPV HR 12 DNA CVX QL NAA+PROBE: NEGATIVE
HPV16 DNA CVX QL NAA+PROBE: NEGATIVE
HPV18 DNA CVX QL NAA+PROBE: NEGATIVE
HUMAN PAPILLOMA VIRUS FINAL DIAGNOSIS: NORMAL

## 2025-08-14 LAB
BKR AP ASSOCIATED HPV REPORT: NORMAL
BKR LAB AP GYN ADEQUACY: NORMAL
BKR LAB AP GYN INTERPRETATION: NORMAL
BKR LAB AP LMP: NORMAL
BKR LAB AP PREVIOUS ABNORMAL: NORMAL
PATH REPORT.COMMENTS IMP SPEC: NORMAL
PATH REPORT.COMMENTS IMP SPEC: NORMAL
PATH REPORT.RELEVANT HX SPEC: NORMAL

## (undated) DEVICE — PREP CHLORAPREP 26ML TINTED HI-LITE ORANGE 930815

## (undated) DEVICE — Device

## (undated) DEVICE — GLOVE PI ULTRATCH M LF SZ 6.5 PF CUFF TEXT STRL LF 42665

## (undated) DEVICE — CUSTOM PACK LAP CHOLE SBA5BLCHEA

## (undated) DEVICE — ESU GROUND PAD ADULT REM W/15' CORD E7507DB

## (undated) DEVICE — STPL ENDO LINEAR CUT ARTICULATING 45MM ATS45

## (undated) DEVICE — ENDO TROCAR SLEEVE KII Z-THREADED 05X100MM CTS02

## (undated) DEVICE — NDL INSUFFLATION 13GA 120MM C2201

## (undated) DEVICE — SUCTION STRYKERFLOW II 250-070-500

## (undated) DEVICE — VIAL DECANTER STERILE WHITE DYNJDEC06

## (undated) DEVICE — SU MONOCRYL+ 4-0 18IN PS2 UND MCP496G

## (undated) DEVICE — SUCTION MANIFOLD NEPTUNE 2 SYS 1 PORT 702-025-000

## (undated) DEVICE — SOL WATER IRRIG 1000ML BOTTLE 2F7114

## (undated) DEVICE — ENDO TROCAR FIRST ENTRY KII FIOS Z-THRD 05X100MM CTF03

## (undated) DEVICE — BASIN EMESIS STERILE  SSK9005A

## (undated) DEVICE — ESU LIGASURE MARYLAND LAPAROSCOPIC SLR/DVDR 5MMX37CM LF1937

## (undated) DEVICE — GOWN LG DISP 9515

## (undated) DEVICE — TUBING SMOKE EVAC PNEUMOCLEAR HIGH FLOW 0620050250

## (undated) DEVICE — SOL RINGERS LACTATED 1000ML BAG 2B2324X

## (undated) DEVICE — STPL ENDO RELOAD 45X3.5MM 6R45B

## (undated) RX ORDER — PROPOFOL 10 MG/ML
INJECTION, EMULSION INTRAVENOUS
Status: DISPENSED
Start: 2025-07-02

## (undated) RX ORDER — FENTANYL CITRATE 50 UG/ML
INJECTION, SOLUTION INTRAMUSCULAR; INTRAVENOUS
Status: DISPENSED
Start: 2025-07-02

## (undated) RX ORDER — LIDOCAINE HYDROCHLORIDE 10 MG/ML
INJECTION, SOLUTION EPIDURAL; INFILTRATION; INTRACAUDAL; PERINEURAL
Status: DISPENSED
Start: 2025-07-02

## (undated) RX ORDER — GLYCOPYRROLATE 0.2 MG/ML
INJECTION, SOLUTION INTRAMUSCULAR; INTRAVENOUS
Status: DISPENSED
Start: 2025-07-02

## (undated) RX ORDER — BUPIVACAINE HYDROCHLORIDE 2.5 MG/ML
INJECTION, SOLUTION INFILTRATION; PERINEURAL
Status: DISPENSED
Start: 2025-07-02